# Patient Record
Sex: FEMALE | Race: WHITE | Employment: OTHER | URBAN - METROPOLITAN AREA
[De-identification: names, ages, dates, MRNs, and addresses within clinical notes are randomized per-mention and may not be internally consistent; named-entity substitution may affect disease eponyms.]

---

## 2018-01-19 ENCOUNTER — TELEPHONE (OUTPATIENT)
Dept: FAMILY MEDICINE | Facility: CLINIC | Age: 63
End: 2018-01-19

## 2018-01-19 NOTE — TELEPHONE ENCOUNTER
Patient is calling because she would like to know if Dr. Guajardo will see her as a new patient. Please follow up with patient.

## 2018-04-16 ENCOUNTER — OFFICE VISIT (OUTPATIENT)
Dept: FAMILY MEDICINE | Facility: CLINIC | Age: 63
End: 2018-04-16
Payer: COMMERCIAL

## 2018-04-16 VITALS
OXYGEN SATURATION: 99 % | WEIGHT: 128 LBS | DIASTOLIC BLOOD PRESSURE: 73 MMHG | HEART RATE: 65 BPM | BODY MASS INDEX: 23.55 KG/M2 | HEIGHT: 62 IN | SYSTOLIC BLOOD PRESSURE: 113 MMHG | TEMPERATURE: 98 F | RESPIRATION RATE: 16 BRPM

## 2018-04-16 DIAGNOSIS — Z00.00 ROUTINE GENERAL MEDICAL EXAMINATION AT A HEALTH CARE FACILITY: Primary | ICD-10-CM

## 2018-04-16 DIAGNOSIS — J45.20 MILD INTERMITTENT ASTHMA WITHOUT COMPLICATION: ICD-10-CM

## 2018-04-16 DIAGNOSIS — F41.9 ANXIETY: ICD-10-CM

## 2018-04-16 DIAGNOSIS — K58.1 IRRITABLE BOWEL SYNDROME WITH CONSTIPATION: ICD-10-CM

## 2018-04-16 DIAGNOSIS — E78.5 HYPERLIPIDEMIA, UNSPECIFIED HYPERLIPIDEMIA TYPE: ICD-10-CM

## 2018-04-16 DIAGNOSIS — L40.9 PSORIASIS: ICD-10-CM

## 2018-04-16 DIAGNOSIS — Z11.59 NEED FOR HEPATITIS C SCREENING TEST: ICD-10-CM

## 2018-04-16 DIAGNOSIS — E03.9 ACQUIRED HYPOTHYROIDISM: ICD-10-CM

## 2018-04-16 LAB
ALBUMIN SERPL-MCNC: 4.1 G/DL (ref 3.4–5)
ALP SERPL-CCNC: 80 U/L (ref 40–150)
ALT SERPL W P-5'-P-CCNC: 30 U/L (ref 0–50)
ANION GAP SERPL CALCULATED.3IONS-SCNC: 5 MMOL/L (ref 3–14)
AST SERPL W P-5'-P-CCNC: 31 U/L (ref 0–45)
BILIRUB SERPL-MCNC: 1.4 MG/DL (ref 0.2–1.3)
BUN SERPL-MCNC: 13 MG/DL (ref 7–30)
CALCIUM SERPL-MCNC: 9.2 MG/DL (ref 8.5–10.1)
CHLORIDE SERPL-SCNC: 108 MMOL/L (ref 94–109)
CHOLEST SERPL-MCNC: 284 MG/DL
CO2 SERPL-SCNC: 29 MMOL/L (ref 20–32)
CREAT SERPL-MCNC: 0.91 MG/DL (ref 0.52–1.04)
GFR SERPL CREATININE-BSD FRML MDRD: 63 ML/MIN/1.7M2
GLUCOSE SERPL-MCNC: 94 MG/DL (ref 70–99)
HDLC SERPL-MCNC: 75 MG/DL
LDLC SERPL CALC-MCNC: 177 MG/DL
MAGNESIUM SERPL-MCNC: 2.5 MG/DL (ref 1.6–2.3)
NONHDLC SERPL-MCNC: 209 MG/DL
POTASSIUM SERPL-SCNC: 3.8 MMOL/L (ref 3.4–5.3)
PROT SERPL-MCNC: 7.4 G/DL (ref 6.8–8.8)
SODIUM SERPL-SCNC: 142 MMOL/L (ref 133–144)
TRIGL SERPL-MCNC: 162 MG/DL
TSH SERPL DL<=0.005 MIU/L-ACNC: 2.27 MU/L (ref 0.4–4)

## 2018-04-16 PROCEDURE — 86141 C-REACTIVE PROTEIN HS: CPT | Performed by: INTERNAL MEDICINE

## 2018-04-16 PROCEDURE — 80053 COMPREHEN METABOLIC PANEL: CPT | Performed by: INTERNAL MEDICINE

## 2018-04-16 PROCEDURE — 99386 PREV VISIT NEW AGE 40-64: CPT | Performed by: INTERNAL MEDICINE

## 2018-04-16 PROCEDURE — 84443 ASSAY THYROID STIM HORMONE: CPT | Performed by: INTERNAL MEDICINE

## 2018-04-16 PROCEDURE — 80061 LIPID PANEL: CPT | Performed by: INTERNAL MEDICINE

## 2018-04-16 PROCEDURE — 36415 COLL VENOUS BLD VENIPUNCTURE: CPT | Performed by: INTERNAL MEDICINE

## 2018-04-16 PROCEDURE — 86803 HEPATITIS C AB TEST: CPT | Performed by: INTERNAL MEDICINE

## 2018-04-16 PROCEDURE — 83735 ASSAY OF MAGNESIUM: CPT | Performed by: INTERNAL MEDICINE

## 2018-04-16 RX ORDER — ALBUTEROL SULFATE 90 UG/1
2 AEROSOL, METERED RESPIRATORY (INHALATION) EVERY 6 HOURS PRN
Qty: 1 INHALER | Refills: 0 | COMMUNITY
Start: 2018-04-16 | End: 2019-11-22

## 2018-04-16 RX ORDER — LEVOTHYROXINE SODIUM 50 UG/1
50 TABLET ORAL
COMMUNITY
Start: 2017-08-28 | End: 2018-06-29

## 2018-04-16 RX ORDER — ZOLPIDEM TARTRATE 10 MG/1
10 TABLET ORAL
COMMUNITY
Start: 2017-05-11 | End: 2018-08-21

## 2018-04-16 RX ORDER — CITALOPRAM HYDROBROMIDE 40 MG/1
TABLET ORAL
COMMUNITY
Start: 2018-01-26 | End: 2018-06-29

## 2018-04-16 RX ORDER — FLUOCINOLONE ACETONIDE 0.1 MG/ML
SOLUTION TOPICAL 2 TIMES DAILY
COMMUNITY
Start: 2018-04-16 | End: 2021-09-24

## 2018-04-16 NOTE — PROGRESS NOTES
"  SUBJECTIVE:   Salma Lew is a 63 year old female who presents to clinic today for the following health issues:      New Patient/Transfer of Care    She has psoriasis on scalp and fragrance allergy  Hands are painful  Ears are itchy    Patient has had anxiety and depression for years  She is on Celexa  No counselor at present      She has low thyroid  And high lipids    She has exercise induced asthma      Patient has IBS with constipation  She has had colon exam  2011  She takes high fibre diet  citrucel is not effective  miralax does not help  Magnesium works    Patient exercises daily Yoga also        Problem list and histories reviewed & adjusted, as indicated.  Additional history: as documented    Past Medical History:   Diagnosis Date     Acquired hypothyroidism 4/16/2018     Anxiety      Hand arthritis      Hyperlipidemia      Hyperlipidemia LDL goal <100      Hypothyroid      Mild intermittent asthma      Psoriasis     scalp         Current Outpatient Prescriptions   Medication Sig Dispense Refill     zolpidem (AMBIEN) 10 MG tablet Take 10 mg by mouth       levothyroxine (SYNTHROID/LEVOTHROID) 50 MCG tablet Take 50 mcg by mouth       citalopram (CELEXA) 40 MG tablet TAKE 1 BY MOUTH EVERY other day       AMOXICILLIN PO For Adult Acne-PRN         Reviewed and updated as needed this visit by clinical staff  Allergies  Meds  Problems  Med Hx       Reviewed and updated as needed this visit by Provider  Allergies  Meds  Problems  Med Hx         ROS:  Constitutional, HEENT, cardiovascular, pulmonary, GI, , musculoskeletal, neuro, skin, endocrine and psych systems are negative, except as otherwise noted.    OBJECTIVE:     /73 (BP Location: Left arm, Patient Position: Chair, Cuff Size: Adult Regular)  Pulse 65  Temp 98  F (36.7  C) (Oral)  Resp 16  Ht 5' 1.5\" (1.562 m)  Wt 128 lb (58.1 kg)  SpO2 99%  BMI 23.79 kg/m2  Body mass index is 23.79 kg/(m^2).  GENERAL: healthy, alert and no " distress  EYES: Eyes grossly normal to inspection, PERRL and conjunctivae and sclerae normal  HENT: ear canals and TM's normal, nose and mouth without ulcers or lesions  NECK: no adenopathy, no asymmetry, masses, or scars and thyroid normal to palpation  RESP: lungs clear to auscultation - no rales, rhonchi or wheezes  BREAST: normal without masses, tenderness or nipple discharge and no palpable axillary masses or adenopathy  CV: regular rate and rhythm, normal S1 S2, no S3 or S4, no murmur, click or rub, no peripheral edema and peripheral pulses strong  ABDOMEN: soft, nontender, no hepatosplenomegaly, no masses and bowel sounds normal  MS: no gross musculoskeletal defects noted, no edema  SKIN:healed lesion   no suspicious lesions or rashes  NEURO: Normal strength and tone, mentation intact and speech normal  PSYCH: mentation appears normal, affect normal/bright        ASSESSMENT/PLAN:             Salma was seen today for establish care.    Diagnoses and all orders for this visit:    Routine general medical examination at a health care facility  Patient is new to me  We discussed about comprehensive health issues    Mammogram colon exam uptodate  shingrix today  Exercise and diet ok  Irritable bowel syndrome with constipation  -     Magnesium  Continue fibre    Psoriasis    On synalar  Discussed about lubrication of skin  Acquired hypothyroidism  -     TSH with free T4 reflex    Hyperlipidemia, unspecified hyperlipidemia type  -     Lipid panel reflex to direct LDL Fasting  -     Comprehensive metabolic panel  -     CRP cardiac risk  Continue statins  Anxiety  Continue celexa    Mild intermittent asthma without complication    occ proair    Need for hepatitis C screening test  -     Hepatitis C Screen Reflex to HCV RNA Quant and Genotype            Saqib Guajardo MD  [facility]

## 2018-04-16 NOTE — MR AVS SNAPSHOT
"              After Visit Summary   4/16/2018    Salma Lew    MRN: [id number]           Patient Information     Date Of Birth          1955        Visit Information        Provider Department      4/16/2018 8:30 AM Saqib Guajardo MD [facility]        Today's Diagnoses     Routine general medical examination at a health care facility    -  1    Irritable bowel syndrome with constipation        Psoriasis        Acquired hypothyroidism        Hyperlipidemia, unspecified hyperlipidemia type        Anxiety        Mild intermittent asthma without complication        Need for hepatitis C screening test           Follow-ups after your visit        Who to contact     If you have questions or need follow up information about today's clinic visit or your schedule please contact [facility] directly at [phone].  Normal or non-critical lab and imaging results will be communicated to you by MyChart, letter or phone within 4 business days after the clinic has received the results. If you do not hear from us within 7 days, please contact the clinic through MyChart or phone. If you have a critical or abnormal lab result, we will notify you by phone as soon as possible.  Submit refill requests through [employer] or call your pharmacy and they will forward the refill request to us. Please allow 3 business days for your refill to be completed.          Additional Information About Your Visit        MyChart Information     [employer] lets you send messages to your doctor, view your test results, renew your prescriptions, schedule appointments and more. To sign up, go to [email or web address] . Click on \"Log in\" on the left side of the screen, which will take you to the Welcome page. Then click on \"Sign up Now\" on the right side of the page.     You will be asked to enter the access code listed below, as well as some personal information. Please follow the directions to create your username and password.   " "  Your access code is: [email or web address]  Expires: 7/15/2018  9:17 AM     Your access code will  in 90 days. If you need help or a new code, please call your [place] clinic or [phone].        Care EveryWhere ID     This is your Care EveryWhere ID. This could be used by other organizations to access your [place] medical records  [id number]        Your Vitals Were     Pulse Temperature Respirations Height Pulse Oximetry BMI (Body Mass Index)    65 98  F (36.7  C) (Oral) 16 5' 1.5\" (1.562 m) 99% 23.79 kg/m2       Blood Pressure from Last 3 Encounters:   18 113/73    Weight from Last 3 Encounters:   18 128 lb (58.1 kg)              We Performed the Following     Comprehensive metabolic panel     CRP cardiac risk     Hepatitis C Screen Reflex to HCV RNA Quant and Genotype     Lipid panel reflex to direct LDL Fasting     Magnesium     TSH with free T4 reflex        Primary Care Provider Office Phone # Fax #    Bhavkaley Guajardo [phone] [address]  [facility] [address]        Equal Access to Services     [facility]: Hadii aad ku hadasho Soomaali, waaxda luqadaha, qaybta kaalmada adechavezda, alfonzo rucker . So [facility] [phone].    ATENCIÓN: Si habla español, tiene a calvert disposición servicios gratuitos de asistencia lingüística. [facility] [phone].    We comply with applicable federal civil rights laws and [place] laws. We do not discriminate on the basis of race, color, national origin, age, disability, sex, sexual orientation, or gender identity.            Thank you!     Thank you for choosing [facility]  for your care. Our goal is always to provide you with excellent care. Hearing back from our patients is one way we can continue to improve our services. Please take a few minutes to complete the written survey that you may receive in the mail after your visit with us. Thank you!             Your Updated Medication List - " Protect others around you: Learn how to safely use, store and throw away your medicines at www.disposemymeds.org.          This list is accurate as of 4/16/18  9:17 AM.  Always use your most recent med list.                   Brand Name Dispense Instructions for use Diagnosis    albuterol 108 (90 Base) MCG/ACT Inhaler    PROAIR HFA/PROVENTIL HFA/VENTOLIN HFA    1 Inhaler    Inhale 2 puffs into the lungs every 6 hours as needed for shortness of breath / dyspnea or wheezing    Mild intermittent asthma without complication       AMOXICILLIN PO      For Adult Acne-PRN        citalopram 40 MG tablet    celeXA     TAKE 1 BY MOUTH EVERY other day        fluocinolone 0.01 % solution    SYNALAR     Apply topically 2 times daily    Psoriasis       levothyroxine 50 MCG tablet    SYNTHROID/LEVOTHROID     Take 50 mcg by mouth        zolpidem 10 MG tablet    AMBIEN     Take 10 mg by mouth

## 2018-04-17 ENCOUNTER — MYC MEDICAL ADVICE (OUTPATIENT)
Dept: FAMILY MEDICINE | Facility: CLINIC | Age: 63
End: 2018-04-17

## 2018-04-17 ENCOUNTER — TELEPHONE (OUTPATIENT)
Dept: FAMILY MEDICINE | Facility: CLINIC | Age: 63
End: 2018-04-17

## 2018-04-17 DIAGNOSIS — E78.5 HYPERLIPIDEMIA LDL GOAL <100: Primary | ICD-10-CM

## 2018-04-17 LAB
CRP SERPL HS-MCNC: 3.3 MG/L
HCV AB SERPL QL IA: NONREACTIVE

## 2018-04-17 RX ORDER — ROSUVASTATIN CALCIUM 10 MG/1
10 TABLET, COATED ORAL DAILY
Qty: 30 TABLET | Refills: 3 | Status: SHIPPED | OUTPATIENT
Start: 2018-04-17 | End: 2018-11-06

## 2018-04-17 RX ORDER — ATORVASTATIN CALCIUM 10 MG/1
10 TABLET, FILM COATED ORAL DAILY
Qty: 90 TABLET | Refills: 1 | Status: SHIPPED | OUTPATIENT
Start: 2018-04-17 | End: 2018-11-06

## 2018-04-17 ASSESSMENT — ASTHMA QUESTIONNAIRES: ACT_TOTALSCORE: 25

## 2018-04-17 NOTE — TELEPHONE ENCOUNTER
Dr Guajardo,  [facility] and advise.  Thanks,  Lucila VELAZQUEZ RN

## 2018-04-18 NOTE — TELEPHONE ENCOUNTER
PCP see below response from patient   Also routing to CS Prior Auth as pt reporting difficulty filling atorvastatin due to insurance    Bianca EPSTEIN RN

## 2018-04-21 ENCOUNTER — HEALTH MAINTENANCE LETTER (OUTPATIENT)
Age: 63
End: 2018-04-21

## 2018-05-11 ENCOUNTER — OFFICE VISIT (OUTPATIENT)
Dept: CARDIOLOGY | Facility: CLINIC | Age: 63
End: 2018-05-11
Attending: INTERNAL MEDICINE
Payer: COMMERCIAL

## 2018-05-11 VITALS
HEART RATE: 60 BPM | DIASTOLIC BLOOD PRESSURE: 84 MMHG | SYSTOLIC BLOOD PRESSURE: 120 MMHG | BODY MASS INDEX: 23.55 KG/M2 | HEIGHT: 62 IN | WEIGHT: 128 LBS

## 2018-05-11 DIAGNOSIS — E78.5 HYPERLIPIDEMIA LDL GOAL <100: Primary | ICD-10-CM

## 2018-05-11 PROCEDURE — 99203 OFFICE O/P NEW LOW 30 MIN: CPT | Performed by: INTERNAL MEDICINE

## 2018-05-11 PROCEDURE — 93000 ELECTROCARDIOGRAM COMPLETE: CPT | Performed by: INTERNAL MEDICINE

## 2018-05-11 NOTE — PROGRESS NOTES
Service Date: 2018      [place] [address]      RE: Salma Lew   MRN: [id number]   : 1955      Dear Dr. Guajardo:       I had the pleasure of seeing your patient Salma Lew at [facility] for evaluation of hyperlipidemia.      Salma Lew is a delightful, 63-year-old female with a history of increasing LDL cholesterol and a recent C-reactive protein of 3.3.  The patient's LDL cholesterol has ranged in  from 160 to  of 99, ranging even higher to 184 in .  More recently on 2018, LDL was 177.  Triglycerides have also varied greatly and recently were 162.  HDL has been high and was 75.  Total cholesterol is 284.  The patient does not eat meat.  She exercises 7 days per week, generally aerobics, such as walking and yoga for  minutes.  She does some biking in the summer.  She has never had a myocardial infarction or stroke.  She denies angina pectoris.  She does not have a history of hypertension or diabetes.  She does have a history of hypothyroidism and is currently on levothyroxine therapy.  Her most recent TSH was normal at 2.27 on 2018.  The patient is a nonsmoker since college.      FAMILY HISTORY:  Father with bypass surgery in his early 60s and congestive heart failure.  No other premature atherosclerosis.      ALLERGIES:  Neosporin.      MEDICATIONS:  As below.  Of note, a prescription was placed for one statin, which was rejected by the insurance company.  Another statin was then placed and accepted, but the patient has not filled this.  I assume the first prescription was with simvastatin and the second atorvastatin.      PAST MEDICAL HISTORY:   1.  Previous childbirth.     2.  Lipoma excision.   3.  Mild intermittent asthma.   4.  Hypothyroidism, on replacement.   5.  Hand arthritis.   6.  Mild intermittent asthma.   7.  Psoriasis.      No history of peptic ulcer disease, cancer,  tuberculosis, kidney stones or disease.      SOCIAL HISTORY:  The patient is  and has 2 grown sons.  Alcohol is 7 glasses of wine per week.  Exercise is as above.      FAMILY HISTORY:  As above.      REVIEW OF SYSTEMS:  A 12 point review of system is performed with pertinent positives and negatives as listed in the HPI.  All other reviews of systems are asked and are negative.      PHYSICAL EXAMINATION:   VITAL SIGNS:  Current blood pressure 120/84, pulse 60 and regular, weight 128 pounds, BMI 24.   GENERAL:  The patient is an alert white female in no acute distress.   HEENT:  Benign without xanthelasma.   NECK:  Supple without thyromegaly.  Carotid upstroke is brisk without bruits.   CHEST:  Clear to auscultation without wheezes, rales or rhonchi.  No kyphosis or scoliosis.   CARDIAC:  Regular rate and rhythm without gallop or murmur.  No heave, rub or thrill.  No JVD or HJR.  Pulses are all intact without bruits.   ABDOMEN:  Soft and nontender without organomegaly.  Bowel sounds are present.  No bruits.   EXTREMITIES:  Without cyanosis, clubbing or edema.      IMAGING:  EKG from today personally reviewed and discussed with the patient shows normal sinus rhythm without ST or T-wave abnormalities.  This is a normal EKG.      I reviewed the patient's old chart and found a stress echocardiogram in 12/2004, which was normal.  An echocardiogram at that same time was normal without valvular abnormalities and normal left ventricular function.      ASSESSMENT:     1.  Salma Lew is a delightful, 63-year-old female with varying LDL cholesterol and mildly elevated triglycerides.  To date, she does not have evidence of known coronary artery disease.  She has no known risk factors.  Her father's coronary artery disease was not premature, and he had significant risk factors for developing that.  The patient's total cholesterol to HDL ratio is less than 4.5.  I suspect that her current levels are more genetic than  lifestyle.  The American Heart Association and American College of Cardiology discussion in 2014 suggested that there are 4 categories that would deserve statin therapy:  Known coronary artery disease, diabetes mellitus, LDL cholesterol greater than 190 and a calculated 10 year risk profile for myocardial infarction or stroke of greater than 7.5%.  In primary prevention, 10% may be more applicable.  We calculated this patient's risk profile at 3.4% with optimal ASCVD risk of 2.3%.  The only risk that this patient has is her C-reactive protein of 3.3.  Perhaps this is due to some hand arthritis.  In order to better understand this patient's risk, we have discussed performing a CT calcium score.  This will be performed in the next week.  If this is normal, we will not begin statin therapy.  If this is abnormal, such as an Agatston score of greater than 200, we will consider beginning statin therapy.  The patient understands this test as low-dose radiation, but no dye.  She is willing to proceed.   2.  I have asked the patient to continue with her excellent exercise routine and include as much aerobic exercise per week as she thinks possible.  This will help lower her triglycerides a bit.      It is my pleasure to assist in the care of Salma Nieves.  I will await the results of the CT calcium score for further assessment and treatment options.  I will make sure that this is conveyed to Dr. Guajardo and the patient as soon as available.  All the patient's questions were answered to her satisfaction.      Sincerely,      Eileen Barnes MD, FACC         EILEEN BARNES MD, FACC             D: 2018   T: 2018   MT: sabas      Name:     SALMA NIEVES   MRN:      [id number]        Account:      [id number]   :      1955           Service Date: 2018      Document: [id number]

## 2018-05-11 NOTE — MR AVS SNAPSHOT
After Visit Summary   5/11/2018    Salma Lew    MRN: [id number]           Patient Information     Date Of Birth          1955        Visit Information        Provider Department      5/11/2018 8:15 AM Gerber Willett MD [facility]        Today's Diagnoses     Hyperlipidemia LDL goal <100    -  1       Follow-ups after your visit        Future tests that were ordered for you today     Open Future Orders        Priority Expected Expires Ordered    CT Coronary Calcium Scan Routine 5/12/2018 5/11/2019 5/11/2018            Who to contact     If you have questions or need follow up information about today's clinic visit or your schedule please contact [facility] directly at [phone].  Normal or non-critical lab and imaging results will be communicated to you by [employer], letter or phone within 4 business days after the clinic has received the results. If you do not hear from us within 7 days, please contact the clinic through [employer] or phone. If you have a critical or abnormal lab result, we will notify you by phone as soon as possible.  Submit refill requests through [employer] or call your pharmacy and they will forward the refill request to us. Please allow 3 business days for your refill to be completed.          Additional Information About Your Visit        MyChart Information     [employer] gives you secure access to your electronic health record. If you see a primary care provider, you can also send messages to your care team and make appointments. If you have questions, please call your primary care clinic.  If you do not have a primary care provider, please call [phone] and they will assist you.        Care EveryWhere ID     This is your Care EveryWhere ID. This could be used by other organizations to access your [place] medical records  [id number]        Your Vitals Were     Pulse Height BMI (Body Mass  "Index)             60 1.562 m (5' 1.5\") 23.79 kg/m2          Blood Pressure from Last 3 Encounters:   05/11/18 120/84   04/16/18 113/73    Weight from Last 3 Encounters:   05/11/18 58.1 kg (128 lb)   04/16/18 58.1 kg (128 lb)              We Performed the Following     EKG 12-lead complete w/read - Clinics (performed today)        Primary Care Provider Office Phone # Fax #    Saqib MD Casandra [phone] [address] [facility] [address]  [facility] [address]        Equal Access to Services     [facility]: Hadii felix Varghese, warj kuo, jordon crawford, alfonzo rucker . So [facility] [phone].    ATENCIÓN: Si habla español, tiene a calvert disposición servicios gratuitos de asistencia lingüística. [facility] [phone].    We comply with applicable federal civil rights laws and [place] laws. We do not discriminate on the basis of race, color, national origin, age, disability, sex, sexual orientation, or gender identity.            Thank you!     Thank you for choosing [facility]  for your care. Our goal is always to provide you with excellent care. Hearing back from our patients is one way we can continue to improve our services. Please take a few minutes to complete the written survey that you may receive in the mail after your visit with us. Thank you!             Your Updated Medication List - Protect others around you: Learn how to safely use, store and throw away your medicines at www.disposemymeds.org.          This list is accurate as of 5/11/18  8:55 AM.  Always use your most recent med list.                   Brand Name Dispense Instructions for use Diagnosis    albuterol 108 (90 Base) MCG/ACT Inhaler    PROAIR HFA/PROVENTIL HFA/VENTOLIN HFA    1 Inhaler    Inhale 2 puffs into the lungs every 6 hours as needed for shortness of breath / dyspnea or wheezing    Mild intermittent asthma without complication       " AMOXICILLIN PO      For Adult Acne-PRN        atorvastatin 10 MG tablet    LIPITOR    90 tablet    Take 1 tablet (10 mg) by mouth daily    Hyperlipidemia LDL goal <100       citalopram 40 MG tablet    celeXA     TAKE 1 BY MOUTH EVERY other day        fluocinolone 0.01 % solution    SYNALAR     Apply topically 2 times daily    Psoriasis       levothyroxine 50 MCG tablet    SYNTHROID/LEVOTHROID     Take 50 mcg by mouth        rosuvastatin 10 MG tablet    CRESTOR    30 tablet    Take 1 tablet (10 mg) by mouth daily    Hyperlipidemia, unspecified hyperlipidemia type       zolpidem 10 MG tablet    AMBIEN     Take 10 mg by mouth

## 2018-05-11 NOTE — LETTER
2018      Saqib Guajardo MD  [address]  [facility] [address]      RE: Salma Lew       Dear Colleague,    I had the pleasure of seeing Salma Lew in the [facility].    Service Date: 2018      Saqib Guajardo MD   [facility]    [address] [place] [address]      RE: Salma Lew   MRN: [id number]   : 1955      Dear Dr. Guajardo:       I had the pleasure of seeing your patient Salma Lew at [facility] for evaluation of hyperlipidemia.      Salma Lew is a delightful, 63-year-old female with a history of increasing LDL cholesterol and a recent C-reactive protein of 3.3.  The patient's LDL cholesterol has ranged in  from 160 to 2005 of 99, ranging even higher to 184 in .  More recently on 2018, LDL was 177.  Triglycerides have also varied greatly and recently were 162.  HDL has been high and was 75.  Total cholesterol is 284.  The patient does not eat meat.  She exercises 7 days per week, generally aerobics, such as walking and yoga for  minutes.  She does some biking in the summer.  She has never had a myocardial infarction or stroke.  She denies angina pectoris.  She does not have a history of hypertension or diabetes.  She does have a history of hypothyroidism and is currently on levothyroxine therapy.  Her most recent TSH was normal at 2.27 on 2018.  The patient is a nonsmoker since college.      FAMILY HISTORY:  Father with bypass surgery in his early 60s and congestive heart failure.  No other premature atherosclerosis.      ALLERGIES:  Neosporin.      MEDICATIONS:  As below.  Of note, a prescription was placed for one statin, which was rejected by the insurance company.  Another statin was then placed and accepted, but the patient has not filled this.  I assume the first prescription was with simvastatin and the second atorvastatin.      PAST MEDICAL HISTORY:   1.  Previous childbirth.      2.  Lipoma excision.   3.  Mild intermittent asthma.   4.  Hypothyroidism, on replacement.   5.  Hand arthritis.   6.  Mild intermittent asthma.   7.  Psoriasis.      No history of peptic ulcer disease, cancer, tuberculosis, kidney stones or disease.      SOCIAL HISTORY:  The patient is  and has 2 grown sons.  Alcohol is 7 glasses of wine per week.  Exercise is as above.      FAMILY HISTORY:  As above.      REVIEW OF SYSTEMS:  A 12 point review of system is performed with pertinent positives and negatives as listed in the HPI.  All other reviews of systems are asked and are negative.      PHYSICAL EXAMINATION:   VITAL SIGNS:  Current blood pressure 120/84, pulse 60 and regular, weight 128 pounds, BMI 24.   GENERAL:  The patient is an alert white female in no acute distress.   HEENT:  Benign without xanthelasma.   NECK:  Supple without thyromegaly.  Carotid upstroke is brisk without bruits.   CHEST:  Clear to auscultation without wheezes, rales or rhonchi.  No kyphosis or scoliosis.   CARDIAC:  Regular rate and rhythm without gallop or murmur.  No heave, rub or thrill.  No JVD or HJR.  Pulses are all intact without bruits.   ABDOMEN:  Soft and nontender without organomegaly.  Bowel sounds are present.  No bruits.   EXTREMITIES:  Without cyanosis, clubbing or edema.      IMAGING:  EKG from today personally reviewed and discussed with the patient shows normal sinus rhythm without ST or T-wave abnormalities.  This is a normal EKG.      I reviewed the patient's old chart and found a stress echocardiogram in 12/2004, which was normal.  An echocardiogram at that same time was normal without valvular abnormalities and normal left ventricular function.      ASSESSMENT:     1.  Samla Lew is a delightful, 63-year-old female with varying LDL cholesterol and mildly elevated triglycerides.  To date, she does not have evidence of known coronary artery disease.  She has no known risk factors.  Her father's coronary artery  disease was not premature, and he had significant risk factors for developing that.  The patient's total cholesterol to HDL ratio is less than 4.5.  I suspect that her current levels are more genetic than lifestyle.  The American Heart Association and American College of Cardiology discussion in 2014 suggested that there are 4 categories that would deserve statin therapy:  Known coronary artery disease, diabetes mellitus, LDL cholesterol greater than 190 and a calculated 10 year risk profile for myocardial infarction or stroke of greater than 7.5%.  In primary prevention, 10% may be more applicable.  We calculated this patient's risk profile at 3.4% with optimal ASCVD risk of 2.3%.  The only risk that this patient has is her C-reactive protein of 3.3.  Perhaps this is due to some hand arthritis.  In order to better understand this patient's risk, we have discussed performing a CT calcium score.  This will be performed in the next week.  If this is normal, we will not begin statin therapy.  If this is abnormal, such as an Agatston score of greater than 200, we will consider beginning statin therapy.  The patient understands this test as low-dose radiation, but no dye.  She is willing to proceed.   2.  I have asked the patient to continue with her excellent exercise routine and include as much aerobic exercise per week as she thinks possible.  This will help lower her triglycerides a bit.      It is my pleasure to assist in the care of Salma Nieves.  I will await the results of the CT calcium score for further assessment and treatment options.  I will make sure that this is conveyed to Dr. Guajardo and the patient as soon as available.  All the patient's questions were answered to her satisfaction.      Sincerely,      Eileen Barnes MD, FACC         EILEEN BARNES MD, FACC             D: 2018   T: 2018   MT: sabas      Name:     SALMA NIEVES   MRN:      [id number]        Account:      [id number]   :       1955           Service Date: 05/11/2018      Document: [id number]         Outpatient Encounter Prescriptions as of 5/11/2018   Medication Sig Dispense Refill     AMOXICILLIN PO For Adult Acne-PRN       citalopram (CELEXA) 40 MG tablet TAKE 1 BY MOUTH EVERY other day       levothyroxine (SYNTHROID/LEVOTHROID) 50 MCG tablet Take 50 mcg by mouth       zolpidem (AMBIEN) 10 MG tablet Take 10 mg by mouth       albuterol (PROAIR HFA/PROVENTIL HFA/VENTOLIN HFA) 108 (90 Base) MCG/ACT Inhaler Inhale 2 puffs into the lungs every 6 hours as needed for shortness of breath / dyspnea or wheezing 1 Inhaler 0     atorvastatin (LIPITOR) 10 MG tablet Take 1 tablet (10 mg) by mouth daily (Patient not taking: Reported on 5/11/2018) 90 tablet 1     fluocinolone (SYNALAR) 0.01 % solution Apply topically 2 times daily       rosuvastatin (CRESTOR) 10 MG tablet Take 1 tablet (10 mg) by mouth daily (Patient not taking: Reported on 5/11/2018) 30 tablet 3     No facility-administered encounter medications on file as of 5/11/2018.        Again, thank you for allowing me to participate in the care of your patient.      Sincerely,    Gerber Willett MD     [facility]

## 2018-05-11 NOTE — LETTER
5/11/2018    Saqib Guajardo MD  [address]  [place] [address]    RE: Salma Lew       Dear Colleague,    I had the pleasure of seeing Salma Lew in the [facility].    HPI and Plan:   See dictation:[id number]    Orders Placed This Encounter   Procedures     CT Coronary Calcium Scan     EKG 12-lead complete w/read - Clinics (performed today)       No orders of the defined types were placed in this encounter.      There are no discontinued medications.      Encounter Diagnosis   Name Primary?     Hyperlipidemia LDL goal <100 Yes       CURRENT MEDICATIONS:  Current Outpatient Prescriptions   Medication Sig Dispense Refill     AMOXICILLIN PO For Adult Acne-PRN       citalopram (CELEXA) 40 MG tablet TAKE 1 BY MOUTH EVERY other day       levothyroxine (SYNTHROID/LEVOTHROID) 50 MCG tablet Take 50 mcg by mouth       zolpidem (AMBIEN) 10 MG tablet Take 10 mg by mouth       albuterol (PROAIR HFA/PROVENTIL HFA/VENTOLIN HFA) 108 (90 Base) MCG/ACT Inhaler Inhale 2 puffs into the lungs every 6 hours as needed for shortness of breath / dyspnea or wheezing 1 Inhaler 0     atorvastatin (LIPITOR) 10 MG tablet Take 1 tablet (10 mg) by mouth daily (Patient not taking: Reported on 5/11/2018) 90 tablet 1     fluocinolone (SYNALAR) 0.01 % solution Apply topically 2 times daily       rosuvastatin (CRESTOR) 10 MG tablet Take 1 tablet (10 mg) by mouth daily (Patient not taking: Reported on 5/11/2018) 30 tablet 3       ALLERGIES     Allergies   Allergen Reactions     Neosporin Rash     Positive allergy skin test       PAST MEDICAL HISTORY:  Past Medical History:   Diagnosis Date     Acquired hypothyroidism 4/16/2018     Anxiety      Hand arthritis      Hyperlipidemia      Hyperlipidemia LDL goal <100      Hypothyroid      Mild intermittent asthma      Psoriasis     scalp       PAST SURGICAL HISTORY:  Past Surgical History:   Procedure Laterality Date     COLONOSCOPY       SURGICAL PATHOLOGY EXAM    "      FAMILY HISTORY:  Family History   Problem Relation Age of Onset     Osteoarthritis Mother      Ovarian Cancer Mother 85     Type 2 Diabetes Father 88     Heart Failure Father      Coronary Artery Disease Father 60     CABG x 4     Obesity Father      Depression Son        SOCIAL HISTORY:  Social History     Social History     Marital status:      Spouse name: N/A     Number of children: N/A     Years of education: N/A     Social History Main Topics     Smoking status: Former Smoker     Smokeless tobacco: Never Used      Comment: teenager for a few years     Alcohol use Yes      Comment: 7/ week- wine     Drug use: No     Sexual activity: Yes     Partners: Male      Comment:      Other Topics Concern     Parent/Sibling W/ Cabg, Mi Or Angioplasty Before 65f 55m? No     Social History Narrative       Review of Systems:  Skin:  Negative       Eyes:  Negative      ENT:  Negative      Respiratory:  Negative       Cardiovascular:  Negative      Gastroenterology: Negative      Genitourinary:  not assessed      Musculoskeletal:  Negative      Neurologic:  Negative      Psychiatric:  Negative      Heme/Lymph/Imm:  Negative      Endocrine:  Negative        Physical Exam:  Vitals: /84  Pulse 60  Ht 1.562 m (5' 1.5\")  Wt 58.1 kg (128 lb)  BMI 23.79 kg/m2    Constitutional:  cooperative, alert and oriented, well developed, well nourished, in no acute distress        Skin:  warm and dry to the touch, no apparent skin lesions or masses noted          Head:  normocephalic, no masses or lesions        Eyes:  pupils equal and round;conjunctivae and lids unremarkable;sclera white;no xanthalasma        Lymph:      ENT:  no pallor or cyanosis, dentition good        Neck:  carotid pulses are full and equal bilaterally, JVP normal, no carotid bruit        Respiratory:  normal breath sounds, clear to auscultation, normal A-P diameter, normal symmetry, normal respiratory excursion, no use of accessory muscles    "      Cardiac: regular rhythm, normal S1/S2, no S3 or S4, apical impulse not displaced, no murmurs, gallops or rubs                pulses full and equal, no bruits auscultated                                        GI:  abdomen soft, non-tender, BS normoactive, no mass, no HSM, no bruits        Extremities and Muscular Skeletal:  no deformities, clubbing, cyanosis, erythema observed              Neurological:  no gross motor deficits;affect appropriate        Psych:  Alert and Oriented x 3        CC  Saqib Guajardo MD  [address]  [place] [address]                Thank you for allowing me to participate in the care of your patient.      Sincerely,     Gerber Willett MD     [facility]    cc:   Saqib Guajardo MD  [address]  [place] [address]

## 2018-05-11 NOTE — PROGRESS NOTES
HPI and Plan:   See dictation:[id number]    Orders Placed This Encounter   Procedures     CT Coronary Calcium Scan     EKG 12-lead complete w/read - Clinics (performed today)       No orders of the defined types were placed in this encounter.      There are no discontinued medications.      Encounter Diagnosis   Name Primary?     Hyperlipidemia LDL goal <100 Yes       CURRENT MEDICATIONS:  Current Outpatient Prescriptions   Medication Sig Dispense Refill     AMOXICILLIN PO For Adult Acne-PRN       citalopram (CELEXA) 40 MG tablet TAKE 1 BY MOUTH EVERY other day       levothyroxine (SYNTHROID/LEVOTHROID) 50 MCG tablet Take 50 mcg by mouth       zolpidem (AMBIEN) 10 MG tablet Take 10 mg by mouth       albuterol (PROAIR HFA/PROVENTIL HFA/VENTOLIN HFA) 108 (90 Base) MCG/ACT Inhaler Inhale 2 puffs into the lungs every 6 hours as needed for shortness of breath / dyspnea or wheezing 1 Inhaler 0     atorvastatin (LIPITOR) 10 MG tablet Take 1 tablet (10 mg) by mouth daily (Patient not taking: Reported on 5/11/2018) 90 tablet 1     fluocinolone (SYNALAR) 0.01 % solution Apply topically 2 times daily       rosuvastatin (CRESTOR) 10 MG tablet Take 1 tablet (10 mg) by mouth daily (Patient not taking: Reported on 5/11/2018) 30 tablet 3       ALLERGIES     Allergies   Allergen Reactions     Neosporin Rash     Positive allergy skin test       PAST MEDICAL HISTORY:  Past Medical History:   Diagnosis Date     Acquired hypothyroidism 4/16/2018     Anxiety      Hand arthritis      Hyperlipidemia      Hyperlipidemia LDL goal <100      Hypothyroid      Mild intermittent asthma      Psoriasis     scalp       PAST SURGICAL HISTORY:  Past Surgical History:   Procedure Laterality Date     COLONOSCOPY       SURGICAL PATHOLOGY EXAM         FAMILY HISTORY:  Family History   Problem Relation Age of Onset     Osteoarthritis Mother      Ovarian Cancer Mother 85     Type 2 Diabetes Father 88     Heart Failure Father      Coronary Artery Disease Father  "60     CABG x 4     Obesity Father      Depression Son        SOCIAL HISTORY:  Social History     Social History     Marital status:      Spouse name: N/A     Number of children: N/A     Years of education: N/A     Social History Main Topics     Smoking status: Former Smoker     Smokeless tobacco: Never Used      Comment: teenager for a few years     Alcohol use Yes      Comment: 7/ week- wine     Drug use: No     Sexual activity: Yes     Partners: Male      Comment:      Other Topics Concern     Parent/Sibling W/ Cabg, Mi Or Angioplasty Before 65f 55m? No     Social History Narrative       Review of Systems:  Skin:  Negative       Eyes:  Negative      ENT:  Negative      Respiratory:  Negative       Cardiovascular:  Negative      Gastroenterology: Negative      Genitourinary:  not assessed      Musculoskeletal:  Negative      Neurologic:  Negative      Psychiatric:  Negative      Heme/Lymph/Imm:  Negative      Endocrine:  Negative        Physical Exam:  Vitals: /84  Pulse 60  Ht 1.562 m (5' 1.5\")  Wt 58.1 kg (128 lb)  BMI 23.79 kg/m2    Constitutional:  cooperative, alert and oriented, well developed, well nourished, in no acute distress        Skin:  warm and dry to the touch, no apparent skin lesions or masses noted          Head:  normocephalic, no masses or lesions        Eyes:  pupils equal and round;conjunctivae and lids unremarkable;sclera white;no xanthalasma        Lymph:      ENT:  no pallor or cyanosis, dentition good        Neck:  carotid pulses are full and equal bilaterally, JVP normal, no carotid bruit        Respiratory:  normal breath sounds, clear to auscultation, normal A-P diameter, normal symmetry, normal respiratory excursion, no use of accessory muscles         Cardiac: regular rhythm, normal S1/S2, no S3 or S4, apical impulse not displaced, no murmurs, gallops or rubs                pulses full and equal, no bruits auscultated                                    "     GI:  abdomen soft, non-tender, BS normoactive, no mass, no HSM, no bruits        Extremities and Muscular Skeletal:  no deformities, clubbing, cyanosis, erythema observed              Neurological:  no gross motor deficits;affect appropriate        Psych:  Alert and Oriented x 3        CC  Saqib Guajardo MD  [address]  [place] [address]

## 2018-05-14 ENCOUNTER — HOSPITAL ENCOUNTER (OUTPATIENT)
Dept: CARDIOLOGY | Facility: CLINIC | Age: 63
Discharge: HOME | End: 2018-05-14
Attending: INTERNAL MEDICINE | Admitting: INTERNAL MEDICINE
Payer: COMMERCIAL

## 2018-05-14 DIAGNOSIS — E78.5 HYPERLIPIDEMIA LDL GOAL <100: ICD-10-CM

## 2018-05-14 PROCEDURE — 75571 CT HRT W/O DYE W/CA TEST: CPT

## 2018-05-14 PROCEDURE — 75571 CT HRT W/O DYE W/CA TEST: CPT | Mod: 26 | Performed by: INTERNAL MEDICINE

## 2018-07-09 ENCOUNTER — MYC MEDICAL ADVICE (OUTPATIENT)
Dept: FAMILY MEDICINE | Facility: CLINIC | Age: 63
End: 2018-07-09

## 2018-07-09 NOTE — TELEPHONE ENCOUNTER
Patient does not need a referral to be seen by Derm. Patient notified by [facility].    Zoie Guerrero  Referral Coordinator

## 2018-08-21 ENCOUNTER — MYC MEDICAL ADVICE (OUTPATIENT)
Dept: FAMILY MEDICINE | Facility: CLINIC | Age: 63
End: 2018-08-21

## 2018-08-21 DIAGNOSIS — G47.00 INSOMNIA, UNSPECIFIED TYPE: Primary | ICD-10-CM

## 2018-08-21 RX ORDER — ZOLPIDEM TARTRATE 10 MG/1
10 TABLET ORAL
Qty: 30 TABLET | Refills: 1 | Status: SHIPPED | OUTPATIENT
Start: 2018-08-21 | End: 2018-08-23

## 2018-08-23 ENCOUNTER — MYC MEDICAL ADVICE (OUTPATIENT)
Dept: FAMILY MEDICINE | Facility: CLINIC | Age: 63
End: 2018-08-23

## 2018-08-23 DIAGNOSIS — G47.00 INSOMNIA, UNSPECIFIED TYPE: Primary | ICD-10-CM

## 2018-08-23 NOTE — TELEPHONE ENCOUNTER
"Pt requesting extended release ambien.  Reports taking \"1/3\" of 10 mg tablet currently but wakes up in middle of night.    Hannah Lubin RN    "

## 2018-08-24 RX ORDER — ZOLPIDEM TARTRATE 10 MG/1
10 TABLET ORAL
Qty: 30 TABLET | Refills: 1 | Status: SHIPPED | OUTPATIENT
Start: 2018-08-24 | End: 2018-12-28

## 2018-08-24 RX ORDER — ZOLPIDEM TARTRATE 6.25 MG/1
12.5 TABLET, FILM COATED, EXTENDED RELEASE ORAL
Qty: 60 TABLET | Refills: 0 | Status: SHIPPED | OUTPATIENT
Start: 2018-08-24 | End: 2018-11-06

## 2018-08-24 NOTE — TELEPHONE ENCOUNTER
To PCP:     Is there an extended-release version of Zolipdem?     Please advise,     Thank you,   Carmina HOOKER RN

## 2018-11-06 ENCOUNTER — OFFICE VISIT (OUTPATIENT)
Dept: FAMILY MEDICINE | Facility: CLINIC | Age: 63
End: 2018-11-06
Payer: COMMERCIAL

## 2018-11-06 VITALS — SYSTOLIC BLOOD PRESSURE: 120 MMHG | HEART RATE: 78 BPM | DIASTOLIC BLOOD PRESSURE: 78 MMHG

## 2018-11-06 DIAGNOSIS — L28.1 PRURIGO NODULARIS: Primary | ICD-10-CM

## 2018-11-06 PROCEDURE — 99214 OFFICE O/P EST MOD 30 MIN: CPT | Performed by: FAMILY MEDICINE

## 2018-11-06 RX ORDER — CLINDAMYCIN PHOSPHATE 10 UG/ML
LOTION TOPICAL
Refills: 3 | COMMUNITY
Start: 2018-07-20 | End: 2021-09-24

## 2018-11-06 RX ORDER — FLUOCINONIDE TOPICAL SOLUTION USP, 0.05% 0.5 MG/ML
SOLUTION TOPICAL
Qty: 60 ML | Refills: 0 | Status: SHIPPED | OUTPATIENT
Start: 2018-11-06 | End: 2021-09-24

## 2018-11-06 RX ORDER — TRIAMCINOLONE ACETONIDE 1 MG/G
CREAM TOPICAL 2 TIMES DAILY
Qty: 30 G | Refills: 1 | Status: SHIPPED | OUTPATIENT
Start: 2018-11-06 | End: 2019-05-28

## 2018-11-06 NOTE — PROGRESS NOTES
[facility] - PRIMARY CARE SKIN    CC : Lesion(s)  SUBJECTIVE:                                                    Salma Lew is a 63 year old female who presents to clinic today because of spots on the back and scalp.    Bothersome lesions noticed by the patient or other skin concerns :  Issue One : Lesion on left posterior shoulder. She has scratched at this spot.  Onset : 9 months ago.  Enlarging : NO.  Bleeding : NO  Itchy or irritating : YES - they are irritated by friction from clothing. Sweating also provokes irritation. She exercises every day.  Pain or tenderness : NO.  Changing color : NO.  Issue Two : Lesion on right scalp.  Onset : 5 years.  Enlarging : NO.  Bleeding : NO  Itchy or irritating : YES.  Pain or tenderness : YES.  Changing color : NO.    Issue Three : She requests a refill of amoxicillin for adult acne.    Personal history of skin cancer : NO.  Family history of skin cancer : NO.    Personal Medical History  Eczema Psoriasis Autoimmune   ?YES ?YES - sensitivities in ears and on scalp NO     Family Medical History  Eczema Psoriasis Autoimmune   NO YES - in father Rheumatoid arthritis in mother     Sun Exposure History  Previous history of significant sun exposure:  Blistering sunburns : YES  Tanning beds : YES - when younger.  Sunscreen Use : YES, SPF : 20-30.  Sun-protective clothing use : No  Wide-brimmed hats : Yes  Sunglasses : Yes    Occupation : photography (both indoor & outdoor).    Refer to electronic medical record (EMR) for past medical history and medications.    INTEGUMENTARY/SKIN: POSITIVE for non-healing lesion  ROS : 14 point review of systems was negative except the symptoms listed above in the HPI.    This document serves as a record of the services and decisions personally performed and made by Princess Haq MD. It was created on her behalf by Simba Craft, a trained medical scribe.  The creation of this document is based on the scribe's personal observations and the  provider's statements to the medical scribe.  Simba Craft, November 6, 2018 9:44 AM      OBJECTIVE:                                                    GENERAL: healthy, alert and no distress  SKIN: Ellis Skin Type - III.  Scalp, Face, Neck and Trunk were examined. The dermatoscope was used to help evaluate pigmented lesions.  Skin Pertinent Findings:  Left upper back : Two 3 mm in size smooth raised hypopigmented lesions. No suspicious characteristics.    Back : left upper back, multiple areas of excoriation    Scalp : 6 mm smooth area of post-inflammatory erythema with overlying excoriation(s) on the right occipital scalp.    Diagnostic Test Results:  none     MDM : discussed the impact of the scratching at the site for causing skin changes.      ASSESSMENT:                                                      Encounter Diagnosis   Name Primary?     Prurigo nodularis Yes         PLAN:                                                    Patient Instructions   FUTURE APPOINTMENTS  Follow up as needed if itchiness persists.    Avoid scratching.    TOPICAL STEROID INSTRUCTIONS  Triamcinolone 0.1% cream.  1. Wash hands before applying topical steroid. Use the adult fingertip unit (FTU) as a guide.    2. Apply sparingly (just enough to rub in) onto affected areas of the left shoulder/back (not to exceed 0.25 FTU per site), two times per day for 2 weeks.  3. Wash off any excess, unused topical steroid.    This higher strength steroid should never be used on face nor groin.    After the initial treatment, topical steroid may be used as needed for flare-ups but only for short-term treatment.    If you are using this for prolonged periods of time to control flare-ups, return to clinic for re-evaluation of treatment.    Keep in mind to also regularly use moisturizer, as this preventative measure can help maintain your skin's natural protective moisture barrier.    TOPICAL STEROID INSTRUCTIONS  Fluocinonide 0.05%  "solution.    Apply a few drops and rub into the affected areas on the scalp, at bedtime for 7 nights.    Never to be used on face or groin.    After the initial treatment, topical steroid may be used as needed for flare-ups but only for short-term treatment.    If you are using this for prolonged periods of time to control flare-ups, return to clinic for re-evaluation of treatment.    DRY SKIN MANAGEMENT INSTRUCTIONS  Routine use of moisturizer is important for healthy, resilient skin not just for soft skin.     Sealing in moisture    Twice daily use of a moisturizer such as over-the-counter (OTC) CeraVe moisturizer cream (in the jar). CeraVe products contain ceramides and filaggrin proteins that can help to maintain the body's moisture layer.    After cleansing or washing, always apply moisturizer immediately after drying off (pat dry only) for best effect.    Protection while hydrating    Do not overuse soap. Unless you have been sweating extensively, just apply soap to groin and armpits.    Recommended products for body include: OTC unscented Dove for sensitive skin or OTC Vanicream cleansing bar.    Recommended facial cleansers include: OTC CeraVe hydrating facial cleanser or OTC Cetaphil daily facial cleanser.    Avoid use of    Scented/perfumed products    Irritating clothing (wool, new jeans, new/unwashed clothing, scratchy synthetics)    Neosporin or triple antibiotic topical products    Products containing aloe, herbs, Vitamin E, or other \"natural ingredients\".    Dryer sheets or fabric softeners (while symptoms are present)    If a topical medication is prescribed, apply topical prescription first, followed by use of moisturizing product.        PROCEDURES:                                                    None.    TT : 20 minutes.  CT : 15 minutes.      The information in this document, created by the medical scribe for me, accurately reflects the services I personally performed and the decisions made by " me. I have reviewed and approved this document for accuracy prior to leaving the patient care area.  November 6, 2018 9:44 AM  Sussy Haq MD  [facility]

## 2018-11-06 NOTE — MR AVS SNAPSHOT
After Visit Summary   11/6/2018    Salma Lew    MRN: [id number]           Patient Information     Date Of Birth          1955        Visit Information        Provider Department      11/6/2018 9:40 AM Sussy Haq [place] [facility] [place]'s Diagnoses     Prurigo nodularis    -  1      Care Instructions    FUTURE APPOINTMENTS  Follow up for a full-body skin cancer screening.  Follow up as needed if itchiness persists.    Avoid scratching.    TOPICAL STEROID INSTRUCTIONS  Triamcinolone 0.1% cream.  1. Wash hands before applying topical steroid. Use the adult fingertip unit (FTU) as a guide.    2. Apply sparingly (just enough to rub in) onto affected areas of the left shoulder/back (not to exceed 0.25 FTU per site), two times per day for 2 weeks.  3. Wash off any excess, unused topical steroid.    This higher strength steroid should never be used on face nor groin.    After the initial treatment, topical steroid may be used as needed for flare-ups but only for short-term treatment.    If you are using this for prolonged periods of time to control flare-ups, return to clinic for re-evaluation of treatment.    Keep in mind to also regularly use moisturizer, as this preventative measure can help maintain your skin's natural protective moisture barrier.    TOPICAL STEROID INSTRUCTIONS  Fluocinonide 0.05% solution.    Apply a few drops and rub into the affected areas on the scalp, at bedtime for 7 nights.    Never to be used on face or groin.    After the initial treatment, topical steroid may be used as needed for flare-ups but only for short-term treatment.    If you are using this for prolonged periods of time to control flare-ups, return to clinic for re-evaluation of treatment.  You may use your previous betamethasone dipropionate 0.05% lotion instead if you still have that at home.    DRY SKIN MANAGEMENT INSTRUCTIONS  Routine use of moisturizer is important for  "healthy, resilient skin not just for soft skin.     Sealing in moisture    Twice daily use of a moisturizer such as over-the-counter (OTC) CeraVe moisturizer cream (in the jar). CeraVe products contain ceramides and filaggrin proteins that can help to maintain the body's moisture layer.    After cleansing or washing, always apply moisturizer immediately after drying off (pat dry only) for best effect.    Protection while hydrating    Do not overuse soap. Unless you have been sweating extensively, just apply soap to groin and armpits.    Recommended products for body include: OTC unscented Dove for sensitive skin or OTC Vanicream cleansing bar.    Recommended facial cleansers include: OTC CeraVe hydrating facial cleanser or OTC Cetaphil daily facial cleanser.    Avoid use of    Scented/perfumed products    Irritating clothing (wool, new jeans, new/unwashed clothing, scratchy synthetics)    Neosporin or triple antibiotic topical products    Products containing aloe, herbs, Vitamin E, or other \"natural ingredients\".    Dryer sheets or fabric softeners (while symptoms are present)    If a topical medication is prescribed, apply topical prescription first, followed by use of moisturizing product.          Follow-ups after your visit        Who to contact     If you have questions or need follow up information about today's clinic visit or your schedule please contact [facility] directly at [phone].  Normal or non-critical lab and imaging results will be communicated to you by MyChart, letter or phone within 4 business days after the clinic has received the results. If you do not hear from us within 7 days, please contact the clinic through MyChart or phone. If you have a critical or abnormal lab result, we will notify you by phone as soon as possible.  Submit refill requests through [employer] or call your pharmacy and they will forward the refill request to us. Please allow 3 business days for your " refill to be completed.          Additional Information About Your Visit        [employer] Information     [employer] gives you secure access to your electronic health record. If you see a primary care provider, you can also send messages to your care team and make appointments. If you have questions, please call your primary care clinic.  If you do not have a primary care provider, please call [phone] and they will assist you.        Care EveryWhere ID     This is your Care EveryWhere ID. This could be used by other organizations to access your [facility] medical records  [id number]        Your Vitals Were     Pulse                   78            Blood Pressure from Last 3 Encounters:   11/06/18 120/78   05/11/18 120/84   04/16/18 113/73    Weight from Last 3 Encounters:   05/11/18 128 lb (58.1 kg)   04/16/18 128 lb (58.1 kg)              Today, you had the following     No orders found for display         Today's Medication Changes          These changes are accurate as of 11/6/18  9:55 AM.  If you have any questions, ask your nurse or doctor.               Start taking these medicines.        Dose/Directions    fluocinonide 0.05 % solution   Commonly known as:  LIDEX   Used for:  Prurigo nodularis   Started by:  Sussy Haq MD        Apply to AA on scalp q hs when needed   Quantity:  60 mL   Refills:  0       triamcinolone 0.1 % cream   Commonly known as:  KENALOG   Used for:  Prurigo nodularis   Started by:  Sussy Haq MD        Apply topically 2 times daily to affected areas on back/shoulder for 10-14 days. Not for use on face nor groin.   Quantity:  30 g   Refills:  1            Where to get your medicines      These medications were sent to [facility] [address] - [address] AT [facility] [address] [address]     Phone:  [phone]     fluocinonide 0.05 % solution    triamcinolone 0.1 % cream                Primary  Care Provider Office Phone # Fax #    Saqib Guajardo [phone] [address] [address]        Equal Access to Services     DOUGLAS MUÑIZ : Hadii aad ku hadnighato Soomaali, waaxda luqadaha, qaybta kaalmada adedeepak, alfonzo mendozathania cardozo. So [facility] [phone].    ATENCIÓN: Si habla español, tiene a calvert disposición servicios gratuitos de asistencia lingüística. Llame al [phone].    We comply with applicable federal civil rights laws and [place] laws. We do not discriminate on the basis of race, color, national origin, age, disability, sex, sexual orientation, or gender identity.            Thank you!     Thank you for choosing [facility]  for your care. Our goal is always to provide you with excellent care. Hearing back from our patients is one way we can continue to improve our services. Please take a few minutes to complete the written survey that you may receive in the mail after your visit with us. Thank you!             Your Updated Medication List - Protect others around you: Learn how to safely use, store and throw away your medicines at www.disposemymeds.org.          This list is accurate as of 11/6/18  9:55 AM.  Always use your most recent med list.                   Brand Name Dispense Instructions for use Diagnosis    albuterol 108 (90 Base) MCG/ACT inhaler    PROAIR HFA/PROVENTIL HFA/VENTOLIN HFA    1 Inhaler    Inhale 2 puffs into the lungs every 6 hours as needed for shortness of breath / dyspnea or wheezing    Mild intermittent asthma without complication       AMOXICILLIN PO      For Adult Acne-PRN        betamethasone dipropionate 0.05 % lotion    DIPROSONE    60 mL    Apply topically to the affected area of the head and ears up to twice daily    Eczema, unspecified type       citalopram 40 MG tablet    celeXA    90 tablet    TAKE 1 BY MOUTH DAILY    Anxiety       clindamycin 1 % lotion    CLEOCIN T           fluocinolone 0.01 % solution    SYNALAR     Apply topically 2 times daily    Psoriasis       fluocinonide 0.05 % solution    LIDEX    60 mL    Apply to AA on scalp q hs when needed    Prurigo nodularis       levothyroxine 50 MCG tablet    SYNTHROID/LEVOTHROID    90 tablet    Take 1 tablet (50 mcg) by mouth daily    Acquired hypothyroidism       triamcinolone 0.1 % cream    KENALOG    30 g    Apply topically 2 times daily to affected areas on back/shoulder for 10-14 days. Not for use on face nor groin.    Prurigo nodularis       zolpidem 10 MG tablet    AMBIEN    30 tablet    Take 1 tablet (10 mg) by mouth nightly as needed for sleep    Insomnia, unspecified type

## 2018-11-06 NOTE — LETTER
11/6/2018         RE: Salma Lew  [address] [facility] [address]        Dear Colleague,    Thank you for referring your patient, Salma Lew, to the [facility]. Please see a copy of my visit note below.    [facility] - PRIMARY CARE SKIN    CC : Lesion(s)  SUBJECTIVE:                                                    Salma Lew is a 63 year old female who presents to clinic today because of spots on the back and scalp.    Bothersome lesions noticed by the patient or other skin concerns :  Issue One : Lesion on left posterior shoulder. She has scratched at this spot.  Onset : 9 months ago.  Enlarging : NO.  Bleeding : NO  Itchy or irritating : YES - they are irritated by friction from clothing. Sweating also provokes irritation. She exercises every day.  Pain or tenderness : NO.  Changing color : NO.  Issue Two : Lesion on right scalp.  Onset : 5 years.  Enlarging : NO.  Bleeding : NO  Itchy or irritating : YES.  Pain or tenderness : YES.  Changing color : NO.    Issue Three : She requests a refill of amoxicillin for adult acne.    Personal history of skin cancer : NO.  Family history of skin cancer : NO.    Personal Medical History  Eczema Psoriasis Autoimmune   ?YES ?YES - sensitivities in ears and on scalp NO     Family Medical History  Eczema Psoriasis Autoimmune   NO YES - in father Rheumatoid arthritis in mother     Sun Exposure History  Previous history of significant sun exposure:  Blistering sunburns : YES  Tanning beds : YES - when younger.  Sunscreen Use : YES, SPF : 20-30.  Sun-protective clothing use : No  Wide-brimmed hats : Yes  Sunglasses : Yes    Occupation : photography (both indoor & outdoor).    Refer to electronic medical record (EMR) for past medical history and medications.    INTEGUMENTARY/SKIN: POSITIVE for non-healing lesion  ROS : 14 point review of systems was negative except the symptoms listed above in the HPI.    This document serves as a record of  the services and decisions personally performed and made by Princess Haq MD. It was created on her behalf by Simba Craft, a trained medical scribe.  The creation of this document is based on the scribe's personal observations and the provider's statements to the medical scribe.  Simba Craft, November 6, 2018 9:44 AM      OBJECTIVE:                                                    GENERAL: healthy, alert and no distress  SKIN: Ellis Skin Type - III.  Scalp, Face, Neck and Trunk were examined. The dermatoscope was used to help evaluate pigmented lesions.  Skin Pertinent Findings:  Left upper back : Two 3 mm in size smooth raised hypopigmented lesions. No suspicious characteristics.    Back : left upper back, multiple areas of excoriation    Scalp : 6 mm smooth area of post-inflammatory erythema with overlying excoriation(s) on the right occipital scalp.    Diagnostic Test Results:  none     MDM : discussed the impact of the scratching at the site for causing skin changes.      ASSESSMENT:                                                      Encounter Diagnosis   Name Primary?     Prurigo nodularis Yes         PLAN:                                                    Patient Instructions   FUTURE APPOINTMENTS  Follow up as needed if itchiness persists.    Avoid scratching.    TOPICAL STEROID INSTRUCTIONS  Triamcinolone 0.1% cream.  1. Wash hands before applying topical steroid. Use the adult fingertip unit (FTU) as a guide.    2. Apply sparingly (just enough to rub in) onto affected areas of the left shoulder/back (not to exceed 0.25 FTU per site), two times per day for 2 weeks.  3. Wash off any excess, unused topical steroid.    This higher strength steroid should never be used on face nor groin.    After the initial treatment, topical steroid may be used as needed for flare-ups but only for short-term treatment.    If you are using this for prolonged periods of time to control flare-ups, return to clinic for  "re-evaluation of treatment.    Keep in mind to also regularly use moisturizer, as this preventative measure can help maintain your skin's natural protective moisture barrier.    TOPICAL STEROID INSTRUCTIONS  Fluocinonide 0.05% solution.    Apply a few drops and rub into the affected areas on the scalp, at bedtime for 7 nights.    Never to be used on face or groin.    After the initial treatment, topical steroid may be used as needed for flare-ups but only for short-term treatment.    If you are using this for prolonged periods of time to control flare-ups, return to clinic for re-evaluation of treatment.    DRY SKIN MANAGEMENT INSTRUCTIONS  Routine use of moisturizer is important for healthy, resilient skin not just for soft skin.     Sealing in moisture    Twice daily use of a moisturizer such as over-the-counter (OTC) CeraVe moisturizer cream (in the jar). CeraVe products contain ceramides and filaggrin proteins that can help to maintain the body's moisture layer.    After cleansing or washing, always apply moisturizer immediately after drying off (pat dry only) for best effect.    Protection while hydrating    Do not overuse soap. Unless you have been sweating extensively, just apply soap to groin and armpits.    Recommended products for body include: OTC unscented Dove for sensitive skin or OTC Vanicream cleansing bar.    Recommended facial cleansers include: OTC CeraVe hydrating facial cleanser or OTC Cetaphil daily facial cleanser.    Avoid use of    Scented/perfumed products    Irritating clothing (wool, new jeans, new/unwashed clothing, scratchy synthetics)    Neosporin or triple antibiotic topical products    Products containing aloe, herbs, Vitamin E, or other \"natural ingredients\".    Dryer sheets or fabric softeners (while symptoms are present)    If a topical medication is prescribed, apply topical prescription first, followed by use of moisturizing product.        PROCEDURES:                            "                         None.    TT : 20 minutes.  CT : 15 minutes.      The information in this document, created by the medical scribe for me, accurately reflects the services I personally performed and the decisions made by me. I have reviewed and approved this document for accuracy prior to leaving the patient care area.  November 6, 2018 9:44 AM  Sussy Haq MD  [facility] [place]    Again, thank you for allowing me to participate in the care of your patient.        Sincerely,        Sussy Haq MD

## 2018-11-06 NOTE — PATIENT INSTRUCTIONS
FUTURE APPOINTMENTS  Follow up for a full-body skin cancer screening.  Follow up as needed if itchiness persists.    Avoid scratching.    TOPICAL STEROID INSTRUCTIONS  Triamcinolone 0.1% cream.  1. Wash hands before applying topical steroid. Use the adult fingertip unit (FTU) as a guide.    2. Apply sparingly (just enough to rub in) onto affected areas of the left shoulder/back (not to exceed 0.25 FTU per site), two times per day for 2 weeks.  3. Wash off any excess, unused topical steroid.    This higher strength steroid should never be used on face nor groin.    After the initial treatment, topical steroid may be used as needed for flare-ups but only for short-term treatment.    If you are using this for prolonged periods of time to control flare-ups, return to clinic for re-evaluation of treatment.    Keep in mind to also regularly use moisturizer, as this preventative measure can help maintain your skin's natural protective moisture barrier.    TOPICAL STEROID INSTRUCTIONS  Fluocinonide 0.05% solution.    Apply a few drops and rub into the affected areas on the scalp, at bedtime for 7 nights.    Never to be used on face or groin.    After the initial treatment, topical steroid may be used as needed for flare-ups but only for short-term treatment.    If you are using this for prolonged periods of time to control flare-ups, return to clinic for re-evaluation of treatment.  You may use your previous betamethasone dipropionate 0.05% lotion instead if you still have that at home.    DRY SKIN MANAGEMENT INSTRUCTIONS  Routine use of moisturizer is important for healthy, resilient skin not just for soft skin.     Sealing in moisture    Twice daily use of a moisturizer such as over-the-counter (OTC) CeraVe moisturizer cream (in the jar). CeraVe products contain ceramides and filaggrin proteins that can help to maintain the body's moisture layer.    After cleansing or washing, always apply moisturizer immediately after  "drying off (pat dry only) for best effect.    Protection while hydrating    Do not overuse soap. Unless you have been sweating extensively, just apply soap to groin and armpits.    Recommended products for body include: OTC unscented Dove for sensitive skin or OTC Vanicream cleansing bar.    Recommended facial cleansers include: OTC CeraVe hydrating facial cleanser or OTC Cetaphil daily facial cleanser.    Avoid use of    Scented/perfumed products    Irritating clothing (wool, new jeans, new/unwashed clothing, scratchy synthetics)    Neosporin or triple antibiotic topical products    Products containing aloe, herbs, Vitamin E, or other \"natural ingredients\".    Dryer sheets or fabric softeners (while symptoms are present)    If a topical medication is prescribed, apply topical prescription first, followed by use of moisturizing product.  "

## 2018-12-10 ENCOUNTER — MYC REFILL (OUTPATIENT)
Dept: FAMILY MEDICINE | Facility: CLINIC | Age: 63
End: 2018-12-10

## 2018-12-10 DIAGNOSIS — E03.9 ACQUIRED HYPOTHYROIDISM: ICD-10-CM

## 2018-12-10 DIAGNOSIS — F41.9 ANXIETY: ICD-10-CM

## 2018-12-11 RX ORDER — CITALOPRAM HYDROBROMIDE 40 MG/1
TABLET ORAL
Qty: 90 TABLET | Refills: 3 | OUTPATIENT
Start: 2018-12-11

## 2018-12-11 RX ORDER — LEVOTHYROXINE SODIUM 50 UG/1
50 TABLET ORAL DAILY
Qty: 90 TABLET | Refills: 3 | OUTPATIENT
Start: 2018-12-11

## 2018-12-11 NOTE — TELEPHONE ENCOUNTER
"Requested Prescriptions   Pending Prescriptions Disp Refills     citalopram (CELEXA) 40 MG tablet 90 tablet 3    Last Written Prescription Date:  7/2/2018  Last Fill Quantity: 90,  # refills: 3   Last office visit: 11/6/2018 with prescribing provider:  Scherman   Future Office Visit:     Sig: TAKE 1 BY MOUTH DAILY    SSRIs Protocol Passed - 12/10/2018 12:16 PM       Passed - Recent (12 mo) or future (30 days) visit within the authorizing provider's specialty    Patient had office visit in the last 12 months or has a visit in the next 30 days with authorizing provider or within the authorizing provider's specialty.  See \"Patient Info\" tab in inbasket, or \"Choose Columns\" in Meds & Orders section of the refill encounter.             Passed - Patient is age 18 or older       Passed - No active pregnancy on record       Passed - No positive pregnancy test in last 12 months        levothyroxine (SYNTHROID/LEVOTHROID) 50 MCG tablet 90 tablet 3    Last Written Prescription Date:  7/2/2018  Last Fill Quantity: 90,  # refills: 3   Last office visit: 11/6/2018 with prescribing provider:  Scherman   Future Office Visit:     Sig: Take 1 tablet (50 mcg) by mouth daily    Thyroid Protocol Passed - 12/10/2018 12:16 PM       Passed - Patient is 12 years or older       Passed - Recent (12 mo) or future (30 days) visit within the authorizing provider's specialty    Patient had office visit in the last 12 months or has a visit in the next 30 days with authorizing provider or within the authorizing provider's specialty.  See \"Patient Info\" tab in inbasket, or \"Choose Columns\" in Meds & Orders section of the refill encounter.             Passed - Normal TSH on file in past 12 months    Recent Labs   Lab Test 04/16/18  0945   TSH 2.27             Passed - No active pregnancy on record    If patient is pregnant or has had a positive pregnancy test, please check TSH.         Passed - No positive pregnancy test in past 12 months    If " patient is pregnant or has had a positive pregnancy test, please check TSH.        Patient has refills remaining at pharmacy.  JO RedN, RN  [facility] [employer] [facility]

## 2018-12-27 DIAGNOSIS — G47.00 INSOMNIA, UNSPECIFIED TYPE: ICD-10-CM

## 2018-12-27 NOTE — TELEPHONE ENCOUNTER
Reason for Call:  Medication or medication refill:    Do you use a [facility]?  Name of the pharmacy and phone number for the current request:       [facility] [address] [facility] [place] - [address] AT [facility] [place] [address]         Name of the medication requested: Zolpidem 10mg    Other request: states she needs today     Can we leave a detailed message on this number? YES    Phone number patient can be reached at: Cell number on file:    Telephone Information:   Mobile [phone]       Best Time: anytime    Call taken on 12/27/2018 at 10:04 AM by Brent Briggs

## 2018-12-27 NOTE — TELEPHONE ENCOUNTER
Requested Prescriptions   Pending Prescriptions Disp Refills     zolpidem (AMBIEN) 10 MG tablet 30 tablet 1     Sig: Take 1 tablet (10 mg) by mouth nightly as needed for sleep    There is no refill protocol information for this order              Last Written Prescription Date:  8/24/18  Last Fill Quantity: 30 tablet,   # refills: 1  Last Office Visit: 4/16/2018 (Casandra)  Future Office visit:       Routing refill request to provider for review/approval because:  Drug not on the [facility], [facility] or [facility] refill protocol or controlled substance

## 2018-12-28 RX ORDER — ZOLPIDEM TARTRATE 10 MG/1
5 TABLET ORAL
Qty: 30 TABLET | Refills: 0 | Status: SHIPPED | OUTPATIENT
Start: 2018-12-28 | End: 2019-03-13

## 2018-12-28 NOTE — TELEPHONE ENCOUNTER
The patient said that if you want to write for 5MG she is ok with that   She was very emotional and said that she really needed the medication yesterday she is going to a  today of her Mother in law and she cant be reached to day but asked if it could be sent this morning please

## 2018-12-28 NOTE — TELEPHONE ENCOUNTER
Left message for pt. Females should not take more than 5mg so I will not write for 10mg nightly. Need to discuss with pt.   DEBRA Lopez CNP

## 2019-03-13 ENCOUNTER — MYC REFILL (OUTPATIENT)
Dept: FAMILY MEDICINE | Facility: CLINIC | Age: 64
End: 2019-03-13

## 2019-03-13 DIAGNOSIS — G47.00 INSOMNIA, UNSPECIFIED TYPE: ICD-10-CM

## 2019-03-15 RX ORDER — ZOLPIDEM TARTRATE 10 MG/1
5 TABLET ORAL
Qty: 30 TABLET | Refills: 0 | Status: SHIPPED | OUTPATIENT
Start: 2019-03-15 | End: 2019-05-28

## 2019-03-15 NOTE — TELEPHONE ENCOUNTER
Zolpidem 10 mg  Last Written Prescription Date:  12/28/2018  Last Fill Quantity: 30,   # refills: 0  Last Office Visit: 11/6/2018  Future Office visit:       Routing refill request to provider for review/approval because:  Drug not on the [facility], [facility] or  Health refill protocol or controlled substance  RN unable to access .  JO SlaughterN, RN  [facility] [facility]

## 2019-05-28 ENCOUNTER — MYC REFILL (OUTPATIENT)
Dept: FAMILY MEDICINE | Facility: CLINIC | Age: 64
End: 2019-05-28

## 2019-05-28 DIAGNOSIS — G47.00 INSOMNIA, UNSPECIFIED TYPE: ICD-10-CM

## 2019-05-28 DIAGNOSIS — L28.1 PRURIGO NODULARIS: ICD-10-CM

## 2019-05-28 RX ORDER — TRIAMCINOLONE ACETONIDE 1 MG/G
CREAM TOPICAL 2 TIMES DAILY
Qty: 30 G | Refills: 1 | Status: SHIPPED | OUTPATIENT
Start: 2019-05-28 | End: 2021-09-24

## 2019-05-28 NOTE — TELEPHONE ENCOUNTER
Requested Prescriptions   Pending Prescriptions Disp Refills     zolpidem (AMBIEN) 10 MG tablet 30 tablet 0     Sig: Take 0.5 tablets (5 mg) by mouth nightly as needed for sleep       There is no refill protocol information for this order            Last Written Prescription Date:  3/15/19  Last Fill Quantity: 30 tablet,   # refills: 0  Last Office Visit: 4/16/2018 (Csaandra)  Future Office visit:       Routing refill request to provider for review/approval because:  Drug not on the [facility], [facility] or [facility] refill protocol or controlled substance

## 2019-05-28 NOTE — TELEPHONE ENCOUNTER
"Requested Prescriptions   Pending Prescriptions Disp Refills     triamcinolone (KENALOG) 0.1 % external cream  Last Written Prescription Date:  11-6-2018  Last Fill Quantity: 30 g,  # refills: 1   Last office visit: 11/6/2018 with prescribing provider:     Future Office Visit:     30 g 1     Sig: Apply topically 2 times daily to affected areas on back/shoulder for 10-14 days. Not for use on face nor groin.       Topical Steroids and Nonsteroidals Protocol Passed - 5/28/2019  7:56 AM        Passed - Patient is age 6 or older        Passed - Authorizing prescriber's most recent note related to this medication read.     If refill request is for ophthalmic use, please forward request to provider for approval.          Passed - High potency steroid not ordered        Passed - Recent (12 mo) or future (30 days) visit within the authorizing provider's specialty     Patient had office visit in the last 12 months or has a visit in the next 30 days with authorizing provider or within the authorizing provider's specialty.  See \"Patient Info\" tab in inbasket, or \"Choose Columns\" in Meds & Orders section of the refill encounter.              Passed - Medication is active on med list          "

## 2019-05-29 RX ORDER — ZOLPIDEM TARTRATE 10 MG/1
5 TABLET ORAL
Qty: 30 TABLET | Refills: 0 | Status: SHIPPED | OUTPATIENT
Start: 2019-05-29 | End: 2019-08-06

## 2019-08-06 ENCOUNTER — MYC REFILL (OUTPATIENT)
Dept: FAMILY MEDICINE | Facility: CLINIC | Age: 64
End: 2019-08-06

## 2019-08-06 DIAGNOSIS — G47.00 INSOMNIA, UNSPECIFIED TYPE: ICD-10-CM

## 2019-08-07 ENCOUNTER — THERAPY VISIT (OUTPATIENT)
Dept: PHYSICAL THERAPY | Facility: CLINIC | Age: 64
End: 2019-08-07
Payer: COMMERCIAL

## 2019-08-07 DIAGNOSIS — G89.29 CHRONIC PAIN OF BOTH SHOULDERS: ICD-10-CM

## 2019-08-07 DIAGNOSIS — M25.511 CHRONIC PAIN OF BOTH SHOULDERS: ICD-10-CM

## 2019-08-07 DIAGNOSIS — R20.0 NUMBNESS AND TINGLING OF BOTH UPPER EXTREMITIES: Primary | ICD-10-CM

## 2019-08-07 DIAGNOSIS — R20.2 NUMBNESS AND TINGLING OF BOTH UPPER EXTREMITIES: Primary | ICD-10-CM

## 2019-08-07 DIAGNOSIS — M25.512 CHRONIC PAIN OF BOTH SHOULDERS: ICD-10-CM

## 2019-08-07 PROCEDURE — 97161 PT EVAL LOW COMPLEX 20 MIN: CPT | Mod: GP | Performed by: PHYSICAL THERAPIST

## 2019-08-07 PROCEDURE — 97110 THERAPEUTIC EXERCISES: CPT | Mod: GP | Performed by: PHYSICAL THERAPIST

## 2019-08-07 NOTE — PROGRESS NOTES
[facility] Initial Evaluation  Subjective:Salma Lew is a 64 year old female.    Patient s chief complaints: Reports increase in tingling in bilateral forearms. Reports has had massage but no change. Reports has had tightness in upper back and shoulders for many years but not the NT. Active with Yoga, biking, gardening, shoveling in garden, pruning.     Condition occurred due to insidious onset.  Date of Onset: MD visit 7/26/19  Location of symptoms is tightness in upper back and neck.  .  Symptoms other than pain include: NT bilateral UE  Quality of pain is NT and tightness and frequency is constant varying degrees.    Pain dependence on time of day is: tight in morning, tight end of day if busy day.   Pain rating is: no pain.    Symptoms are exacerbated by: gardening.    Symptoms are relieved by:  nothing.    Progression of symptoms is that symptoms are:  More aware of it.  Imaging/Special tests include: nothing   Previous treatments include: massages.   Patient reports that general health is: excellent.   Pertinent medical history includes:  Asthma - exercise induced,.    Medical allergies includes: neosporin, frongronus(what it looked like was spelled on form).   Surgical history includes: none.  Current medications include: anti-depressant, sleep, thryroid  Current occupation is: self-photogropher  Work status is: own hours  Primary job tasks include: computer work, holding camera  Barriers include: none  Red flags include: none    Patient's expectations for therapy include: Decrease numbness/tingling in upper extremities.    The history is provided by the patient. No  was used.                       Objective:  System              Cervical/Thoracic Evaluation    AROM:  AROM Cervical:    Flexion:            50 degrees  Extension:       45 degrees  Rotation:         Left: 50 degrees     Right: 40 degrees pain on the right with stretching  Side Bend:      Left: 30 degrees  pain left side of neck and down arm     Right:  30 degrees  AROM Thoracic:    Flexion:              Extension:           Rotation:            Left: 30 degrees     Right: 20 degrees      Headaches: none  Cervical Myotomes:    C1-2 (Neck Flex): Left:  5    Right: 5  C3 (neck side bend): Left: 5    Right: 5  C4 (shrug):  Left: 5    Right: 5  C5 (Deltoid):  Left: 3    Right: 3  C6 (Biceps):  Left: 5    Right: 5  C7 (Triceps):  Left: 5    Right: 5  C8 (Thumb Ext): Left: 4    Right: 4  T1 (Intrinsics): Left: 5    Right: 5  DTR's:  not assessed        Neural Tension:    Left side:  Radial; Ulnar and Median positive.  Right side:  Radial; Ulnar and Median positive.  Cervical Dermatomes:  Cervical dermatomes: normal per patient.                          Spinal Segmental Conclusions:  Dural tension    Elevated 1st rib bilaterally    Minimal facet and rib head movement bilaterally T1-8     Trunk SBL fingertip to knee pain in low back, SBR pain right SI.     Sacrum flared.         Cord Sign:  normal                                            General     ROS    Assessment/Plan:    Patient is a 64 year old female with both sides shoulder, both sides wrist/hand and bilateral UE NT complaints.    Patient has the following significant findings with corresponding treatment plan.                Diagnosis 1:  Bilateral shoulder/wrist pain  Pain -  hot/cold therapy, manual therapy, self management, education and home program  Decreased ROM/flexibility - manual therapy, therapeutic exercise, therapeutic activity and home program  Decreased strength - therapeutic exercise, therapeutic activities and home program  Impaired posture - neuro re-education, therapeutic activities and home program  Diagnosis 2:  Bilateral UE NT   Pain -  hot/cold therapy, manual therapy, self management, education and home program  Impaired posture - neuro re-education, therapeutic activities and home program  NT - hot/cold, manual therapy, HEP    Therapy  Evaluation Codes:   1) History comprised of:   Personal factors that impact the plan of care:      Past/current experiences and Time since onset of symptoms.    Comorbidity factors that impact the plan of care are:      Asthma.     Medications impacting care: Anti-depressant, Sleep and thyroid.  2) Examination of Body Systems comprised of:   Body structures and functions that impact the plan of care:      Cervical spine, Shoulder and Wrist.   Activity limitations that impact the plan of care are:      Lifting, Reading/Computer work, Sports, Throwing and Sleeping.  3) Clinical presentation characteristics are:   Stable/Uncomplicated.  4) Decision-Making    Low complexity using standardized patient assessment instrument and/or measureable assessment of functional outcome.  Cumulative Therapy Evaluation is: Low complexity.    Previous and current functional limitations:  (See Goal Flow Sheet for this information)    Short term and Long term goals: (See Goal Flow Sheet for this information)     Communication ability:  Patient appears to be able to clearly communicate and understand verbal and written communication and follow directions correctly.  Treatment Explanation - The following has been discussed with the patient:   RX ordered/plan of care  Anticipated outcomes  Possible risks and side effects  This patient would benefit from PT intervention to resume normal activities.   Rehab potential is good.    Frequency:  1 X week, once daily  Duration:  for 10 weeks  Discharge Plan:  Achieve all LTG.  Independent in home treatment program.  Reach maximal therapeutic benefit.    Please refer to the daily flowsheet for treatment today, total treatment time and time spent performing 1:1 timed codes.

## 2019-08-08 RX ORDER — ZOLPIDEM TARTRATE 10 MG/1
5 TABLET ORAL
Qty: 15 TABLET | Refills: 0 | Status: SHIPPED | OUTPATIENT
Start: 2019-08-08 | End: 2019-10-11

## 2019-08-08 NOTE — TELEPHONE ENCOUNTER
Rx Ready- Faxed to Leigh Elias [id number]  [facility] Unit Coordinator

## 2019-08-08 NOTE — TELEPHONE ENCOUNTER
Patient saw  once, April 2018.  Notified that she is due for an office visit.    Pended one month supply of Zolpidem for review.  Print if appropriate.    Thank you,  Tammie Pruett RN on 8/8/2019 at 9:17 AM

## 2019-08-13 PROBLEM — R20.0 NUMBNESS AND TINGLING OF BOTH UPPER EXTREMITIES: Status: ACTIVE | Noted: 2019-08-13

## 2019-08-13 PROBLEM — M25.511 BILATERAL SHOULDER PAIN: Status: ACTIVE | Noted: 2019-08-13

## 2019-08-13 PROBLEM — M25.512 BILATERAL SHOULDER PAIN: Status: ACTIVE | Noted: 2019-08-13

## 2019-08-13 PROBLEM — R20.2 NUMBNESS AND TINGLING OF BOTH UPPER EXTREMITIES: Status: ACTIVE | Noted: 2019-08-13

## 2019-08-14 ENCOUNTER — THERAPY VISIT (OUTPATIENT)
Dept: PHYSICAL THERAPY | Facility: CLINIC | Age: 64
End: 2019-08-14
Payer: COMMERCIAL

## 2019-08-14 DIAGNOSIS — M25.512 CHRONIC PAIN OF BOTH SHOULDERS: ICD-10-CM

## 2019-08-14 DIAGNOSIS — M25.511 CHRONIC PAIN OF BOTH SHOULDERS: ICD-10-CM

## 2019-08-14 DIAGNOSIS — G89.29 CHRONIC PAIN OF BOTH SHOULDERS: ICD-10-CM

## 2019-08-14 DIAGNOSIS — R20.2 NUMBNESS AND TINGLING OF BOTH UPPER EXTREMITIES: Primary | ICD-10-CM

## 2019-08-14 DIAGNOSIS — R20.0 NUMBNESS AND TINGLING OF BOTH UPPER EXTREMITIES: Primary | ICD-10-CM

## 2019-08-14 PROCEDURE — 97140 MANUAL THERAPY 1/> REGIONS: CPT | Mod: GP | Performed by: PHYSICAL THERAPIST

## 2019-08-14 PROCEDURE — 97110 THERAPEUTIC EXERCISES: CPT | Mod: GP | Performed by: PHYSICAL THERAPIST

## 2019-08-14 PROCEDURE — 97112 NEUROMUSCULAR REEDUCATION: CPT | Mod: GP | Performed by: PHYSICAL THERAPIST

## 2019-08-22 ENCOUNTER — THERAPY VISIT (OUTPATIENT)
Dept: PHYSICAL THERAPY | Facility: CLINIC | Age: 64
End: 2019-08-22
Payer: COMMERCIAL

## 2019-08-22 DIAGNOSIS — M25.511 CHRONIC PAIN OF BOTH SHOULDERS: ICD-10-CM

## 2019-08-22 DIAGNOSIS — G89.29 CHRONIC PAIN OF BOTH SHOULDERS: ICD-10-CM

## 2019-08-22 DIAGNOSIS — R20.0 NUMBNESS AND TINGLING OF BOTH UPPER EXTREMITIES: Primary | ICD-10-CM

## 2019-08-22 DIAGNOSIS — R20.2 NUMBNESS AND TINGLING OF BOTH UPPER EXTREMITIES: Primary | ICD-10-CM

## 2019-08-22 DIAGNOSIS — M25.512 CHRONIC PAIN OF BOTH SHOULDERS: ICD-10-CM

## 2019-08-22 PROCEDURE — 97140 MANUAL THERAPY 1/> REGIONS: CPT | Mod: GP | Performed by: PHYSICAL THERAPIST

## 2019-08-22 PROCEDURE — 97110 THERAPEUTIC EXERCISES: CPT | Mod: GP | Performed by: PHYSICAL THERAPIST

## 2019-08-22 PROCEDURE — 97112 NEUROMUSCULAR REEDUCATION: CPT | Mod: GP | Performed by: PHYSICAL THERAPIST

## 2019-08-30 ENCOUNTER — THERAPY VISIT (OUTPATIENT)
Dept: PHYSICAL THERAPY | Facility: CLINIC | Age: 64
End: 2019-08-30
Payer: COMMERCIAL

## 2019-08-30 DIAGNOSIS — M25.512 CHRONIC PAIN OF BOTH SHOULDERS: ICD-10-CM

## 2019-08-30 DIAGNOSIS — M25.511 CHRONIC PAIN OF BOTH SHOULDERS: ICD-10-CM

## 2019-08-30 DIAGNOSIS — G89.29 CHRONIC PAIN OF BOTH SHOULDERS: ICD-10-CM

## 2019-08-30 DIAGNOSIS — R20.2 NUMBNESS AND TINGLING OF BOTH UPPER EXTREMITIES: Primary | ICD-10-CM

## 2019-08-30 DIAGNOSIS — R20.0 NUMBNESS AND TINGLING OF BOTH UPPER EXTREMITIES: Primary | ICD-10-CM

## 2019-08-30 PROCEDURE — 97110 THERAPEUTIC EXERCISES: CPT | Mod: GP | Performed by: PHYSICAL THERAPIST

## 2019-08-30 PROCEDURE — 97112 NEUROMUSCULAR REEDUCATION: CPT | Mod: GP | Performed by: PHYSICAL THERAPIST

## 2019-08-30 PROCEDURE — 97140 MANUAL THERAPY 1/> REGIONS: CPT | Mod: GP | Performed by: PHYSICAL THERAPIST

## 2019-09-03 ENCOUNTER — THERAPY VISIT (OUTPATIENT)
Dept: PHYSICAL THERAPY | Facility: CLINIC | Age: 64
End: 2019-09-03
Payer: COMMERCIAL

## 2019-09-03 DIAGNOSIS — R20.0 NUMBNESS AND TINGLING OF BOTH UPPER EXTREMITIES: Primary | ICD-10-CM

## 2019-09-03 DIAGNOSIS — R20.2 NUMBNESS AND TINGLING OF BOTH UPPER EXTREMITIES: Primary | ICD-10-CM

## 2019-09-03 DIAGNOSIS — M25.512 CHRONIC PAIN OF BOTH SHOULDERS: ICD-10-CM

## 2019-09-03 DIAGNOSIS — M25.511 CHRONIC PAIN OF BOTH SHOULDERS: ICD-10-CM

## 2019-09-03 DIAGNOSIS — G89.29 CHRONIC PAIN OF BOTH SHOULDERS: ICD-10-CM

## 2019-09-03 PROCEDURE — 97110 THERAPEUTIC EXERCISES: CPT | Mod: GP | Performed by: PHYSICAL THERAPIST

## 2019-09-03 PROCEDURE — 97112 NEUROMUSCULAR REEDUCATION: CPT | Mod: GP | Performed by: PHYSICAL THERAPIST

## 2019-09-03 PROCEDURE — 97140 MANUAL THERAPY 1/> REGIONS: CPT | Mod: GP | Performed by: PHYSICAL THERAPIST

## 2019-09-10 ENCOUNTER — MYC REFILL (OUTPATIENT)
Dept: FAMILY MEDICINE | Facility: CLINIC | Age: 64
End: 2019-09-10

## 2019-09-10 ENCOUNTER — OFFICE VISIT (OUTPATIENT)
Dept: FAMILY MEDICINE | Facility: CLINIC | Age: 64
End: 2019-09-10
Payer: COMMERCIAL

## 2019-09-10 ENCOUNTER — THERAPY VISIT (OUTPATIENT)
Dept: PHYSICAL THERAPY | Facility: CLINIC | Age: 64
End: 2019-09-10
Payer: COMMERCIAL

## 2019-09-10 VITALS — DIASTOLIC BLOOD PRESSURE: 84 MMHG | TEMPERATURE: 98.6 F | SYSTOLIC BLOOD PRESSURE: 125 MMHG

## 2019-09-10 DIAGNOSIS — R20.0 NUMBNESS AND TINGLING OF BOTH UPPER EXTREMITIES: Primary | ICD-10-CM

## 2019-09-10 DIAGNOSIS — M25.512 CHRONIC PAIN OF BOTH SHOULDERS: ICD-10-CM

## 2019-09-10 DIAGNOSIS — E03.9 ACQUIRED HYPOTHYROIDISM: ICD-10-CM

## 2019-09-10 DIAGNOSIS — R20.2 NUMBNESS AND TINGLING OF BOTH UPPER EXTREMITIES: Primary | ICD-10-CM

## 2019-09-10 DIAGNOSIS — M25.511 CHRONIC PAIN OF BOTH SHOULDERS: ICD-10-CM

## 2019-09-10 DIAGNOSIS — G89.29 CHRONIC PAIN OF BOTH SHOULDERS: ICD-10-CM

## 2019-09-10 DIAGNOSIS — Z71.84 COUNSELING ABOUT TRAVEL: Primary | ICD-10-CM

## 2019-09-10 DIAGNOSIS — F41.9 ANXIETY: ICD-10-CM

## 2019-09-10 PROCEDURE — 90717 YELLOW FEVER VACCINE SUBQ: CPT | Performed by: FAMILY MEDICINE

## 2019-09-10 PROCEDURE — 97110 THERAPEUTIC EXERCISES: CPT | Mod: GP | Performed by: PHYSICAL THERAPIST

## 2019-09-10 PROCEDURE — 90691 TYPHOID VACCINE IM: CPT | Performed by: FAMILY MEDICINE

## 2019-09-10 PROCEDURE — 90472 IMMUNIZATION ADMIN EACH ADD: CPT | Performed by: FAMILY MEDICINE

## 2019-09-10 PROCEDURE — 90715 TDAP VACCINE 7 YRS/> IM: CPT | Performed by: FAMILY MEDICINE

## 2019-09-10 PROCEDURE — 97140 MANUAL THERAPY 1/> REGIONS: CPT | Mod: GP | Performed by: PHYSICAL THERAPIST

## 2019-09-10 PROCEDURE — 99402 PREV MED CNSL INDIV APPRX 30: CPT | Mod: 25 | Performed by: FAMILY MEDICINE

## 2019-09-10 PROCEDURE — 90471 IMMUNIZATION ADMIN: CPT | Performed by: FAMILY MEDICINE

## 2019-09-10 PROCEDURE — 97112 NEUROMUSCULAR REEDUCATION: CPT | Mod: GP | Performed by: PHYSICAL THERAPIST

## 2019-09-10 RX ORDER — ATOVAQUONE AND PROGUANIL HYDROCHLORIDE 250; 100 MG/1; MG/1
TABLET, FILM COATED ORAL
Qty: 25 TABLET | Refills: 0 | Status: SHIPPED | OUTPATIENT
Start: 2019-09-10 | End: 2019-11-22

## 2019-09-10 RX ORDER — AZITHROMYCIN 500 MG/1
TABLET, FILM COATED ORAL
Qty: 3 TABLET | Refills: 0 | Status: SHIPPED | OUTPATIENT
Start: 2019-09-10 | End: 2019-11-22

## 2019-09-10 NOTE — PROGRESS NOTES
Itinerary:  [place] and [place]     Departure Date: 10/23/2019    Return Date: 11/20/2019    Length of Trip: about 1 month     Purpose of Trip: pleasure    Urban/Rural: both     Accommodations: hotels     IMMUNIZATION HISTORY  Have you received any immunizations within the past 4 weeks?  No  Have you ever fainted from having your blood drawn or from an injection?  No  Have you ever had a fever reaction to vaccination?  No  Have you ever had any bad reaction or side effect from any vaccination?  No  Have you ever had hepatitis A or B vaccine?  unknown  Do you live (or work closely) with anyone who has AIDS, an AIDS-like condition, any other immune disorder or who is on chemotherapy for cancer or a   family history of immunodeficiency?  No  Have you received any injection of immune globulin or any blood products during the past 12 months?  No    Patient roomed by TASHA Brown     Special medical concerns: none    /84   Temp 98.6  F (37  C) (Oral)   EXAM: deferred    Stamaril Informed Consent    The patient was provided with a copy of the IRB-approved consent form and all questions were answered before the patient agreed to participate by signing the informed consent document.   A copy of the form was provided to the patient.    Date: 9/10/19  Consent Version Date: 5/10/17  Consent Obtained by:  PIERRE  HIPAA:  Yes  HIPAA Authorization Signed Date: 9/10/19      Inclusion/Exclusion Criteria:    (Similar to Yellow Fever-VAX)      The patient met all of the following inclusion criteria in order to be eligible for the Stamaril vaccination under this EAP (Expanded Access Investigational New Drug Program)           At increased risk for YF, including researchers, laboratory workers, vaccine production staff, and those who are traveling within 30 days to a YF-endemic region or to a country requiring proof of YF vaccination under IHRs (International Health Regulations)?       Yes     Patient is greater than or equal to 9  months of age on the day of vaccination?     Yes     Patient is greater than or equal to 18 years of age and signed and dated the Consent Forms?     Yes     Patient is < 18 years of age and parent(s)/guardian(s) signed and dated the Consent Forms?      Patient is 7 years to < 18 years of age and signed and dated the Assent form?        No Assent is required.  Patient is <7 years of age.     No      No      N/A     The patient did not meet any of the following criteria that would have excluded the patient from receiving the Stamaril vaccination under this EAP              Patient is less than 9 months of age.       No     The patient is breast-feeding and cannot stop nursing for at least 14 days after vaccination.    Note: Yellow Fever vaccine virus may be transmissible via breast milk by nursing mothers who are vaccinated during the final 2 weeks of pregnancy or post-partum.   Following transmission, infants may develop encephalitis.  The minimum time of discontinuation of breastfeeding for 14 days after vaccination is based on the expected clearance of live-attenuated vaccine virus.       No     The patient is immunosuppressed, whether congenital or idiopathic, including for example, leukemia, lymphoma, other malignancies, and patients who are receiving immunosuppressant medications (e.g. Systemic corticosteroids [greater than the standard dose of topical or inhaled steroids], alkylating drugs, antimetabolites, of other cytotoxic or immunomodulatory drugs) or radiation therapy or organ transplantation.       No     The patient has known hypersensitivity to the active substance or to any of the excipients of Stamaril vaccine or to eggs or chicken proteins.     No     The patient is symptomatic for human immunodeficiency virus (HIV) infection     No     The patient is asymptomatic for HIV infection but accompanied by evidence of severe immune suppression    Note:  Evidence of severe immune suppression includes CD4+  T-cell counts < 200 cubic millimeters (or < 15% total lymphocytes in children aged < 6 years), or as determined by the health care provider.       No     The patient has a history of thymus dysfunction (including myasthenia gravis, thymoma, thymectomy)     No     Moderate or severe febrile illness or acute illness    Note: Participation in the EAP can be reassessed when moderate or severe febrile illness or acute illness has resolved.       No           Immunizations discussed include: Typhoid, Yellow Fever, Tetanus/Diphtheria and   Discussed hep A however it has neosporin and patient has allergy to the medication  Malaraia prophylaxis recommended: Malarone  Symptomatic treatment for traveler's diarrhea: azithromycin    ASSESSMENT/PLAN:  1. Counseling about travel     - atovaquone-proguanil (MALARONE) 250-100 MG tablet; Take one tablet daily, start 2 days prior to travel to malaria area and continue daily while there and for 7 days after leaving malaria area  Dispense: 25 tablet; Refill: 0  - azithromycin (ZITHROMAX) 500 MG tablet; Take one tablet daily for up to 3 days as needed for traveler's diarrhea  Dispense: 3 tablet; Refill: 0  I have reviewed general recommendations for safe travel   including: food/water precautions, insect avoidance,   roadway safety. Educational materials and [employer] report provided.    Total visit time 30 minutes with over 50% of time spent counseling patient.

## 2019-09-11 RX ORDER — LEVOTHYROXINE SODIUM 50 UG/1
50 TABLET ORAL DAILY
Qty: 30 TABLET | Refills: 0 | Status: SHIPPED | OUTPATIENT
Start: 2019-09-11 | End: 2019-11-22

## 2019-09-11 RX ORDER — CITALOPRAM HYDROBROMIDE 40 MG/1
TABLET ORAL
Qty: 30 TABLET | Refills: 0 | Status: SHIPPED | OUTPATIENT
Start: 2019-09-11 | End: 2019-11-22

## 2019-09-11 NOTE — TELEPHONE ENCOUNTER
"Medication is being filled for 1 time refill only due to:  Patient needs to be seen because it has been more than one year since last visit.   [email or web address] sent to pt  Lucila VELAZQUEZ RN    Last Written Prescription Date:  7/2/2018  Last Fill Quantity: 90,  # refills: 3   Last office visit: 9/10/2019 with prescribing provider:     Future Office Visit:    Requested Prescriptions   Pending Prescriptions Disp Refills     citalopram (CELEXA) 40 MG tablet 90 tablet 3     Sig: TAKE 1 BY MOUTH DAILY       SSRIs Protocol Failed - 9/10/2019  2:59 PM        Failed - Recent (12 mo) or future (30 days) visit within the authorizing provider's specialty     Patient had office visit in the last 12 months or has a visit in the next 30 days with authorizing provider or within the authorizing provider's specialty.  See \"Patient Info\" tab in inbasket, or \"Choose Columns\" in Meds & Orders section of the refill encounter.              Passed - Medication is active on med list        Passed - Patient is age 18 or older        Passed - No active pregnancy on record        Passed - No positive pregnancy test in last 12 months        levothyroxine (SYNTHROID/LEVOTHROID) 50 MCG tablet 90 tablet 3     Sig: Take 1 tablet (50 mcg) by mouth daily       Thyroid Protocol Failed - 9/10/2019  2:59 PM        Failed - Recent (12 mo) or future (30 days) visit within the authorizing provider's specialty     Patient had office visit in the last 12 months or has a visit in the next 30 days with authorizing provider or within the authorizing provider's specialty.  See \"Patient Info\" tab in inbasket, or \"Choose Columns\" in Meds & Orders section of the refill encounter.              Failed - Normal TSH on file in past 12 months     Recent Labs   Lab Test 04/16/18  0945   TSH 2.27              Passed - Patient is 12 years or older        Passed - Medication is active on med list        Passed - No active pregnancy on record     If patient is pregnant or has had a " positive pregnancy test, please check TSH.          Passed - No positive pregnancy test in past 12 months     If patient is pregnant or has had a positive pregnancy test, please check TSH.

## 2019-09-17 ENCOUNTER — THERAPY VISIT (OUTPATIENT)
Dept: PHYSICAL THERAPY | Facility: CLINIC | Age: 64
End: 2019-09-17
Payer: COMMERCIAL

## 2019-09-17 DIAGNOSIS — M25.511 CHRONIC PAIN OF BOTH SHOULDERS: ICD-10-CM

## 2019-09-17 DIAGNOSIS — R20.0 NUMBNESS AND TINGLING OF BOTH UPPER EXTREMITIES: Primary | ICD-10-CM

## 2019-09-17 DIAGNOSIS — G89.29 CHRONIC PAIN OF BOTH SHOULDERS: ICD-10-CM

## 2019-09-17 DIAGNOSIS — M25.512 CHRONIC PAIN OF BOTH SHOULDERS: ICD-10-CM

## 2019-09-17 DIAGNOSIS — R20.2 NUMBNESS AND TINGLING OF BOTH UPPER EXTREMITIES: Primary | ICD-10-CM

## 2019-09-17 PROCEDURE — 97140 MANUAL THERAPY 1/> REGIONS: CPT | Mod: GP | Performed by: PHYSICAL THERAPIST

## 2019-09-17 PROCEDURE — 97110 THERAPEUTIC EXERCISES: CPT | Mod: GP | Performed by: PHYSICAL THERAPIST

## 2019-09-17 PROCEDURE — 97112 NEUROMUSCULAR REEDUCATION: CPT | Mod: GP | Performed by: PHYSICAL THERAPIST

## 2019-10-01 ENCOUNTER — THERAPY VISIT (OUTPATIENT)
Dept: PHYSICAL THERAPY | Facility: CLINIC | Age: 64
End: 2019-10-01
Payer: COMMERCIAL

## 2019-10-01 DIAGNOSIS — G89.29 CHRONIC PAIN OF BOTH SHOULDERS: ICD-10-CM

## 2019-10-01 DIAGNOSIS — M25.512 CHRONIC PAIN OF BOTH SHOULDERS: ICD-10-CM

## 2019-10-01 DIAGNOSIS — R20.0 NUMBNESS AND TINGLING OF BOTH UPPER EXTREMITIES: Primary | ICD-10-CM

## 2019-10-01 DIAGNOSIS — R20.2 NUMBNESS AND TINGLING OF BOTH UPPER EXTREMITIES: Primary | ICD-10-CM

## 2019-10-01 DIAGNOSIS — M25.511 CHRONIC PAIN OF BOTH SHOULDERS: ICD-10-CM

## 2019-10-01 PROCEDURE — 97140 MANUAL THERAPY 1/> REGIONS: CPT | Mod: GP | Performed by: PHYSICAL THERAPIST

## 2019-10-01 PROCEDURE — 97112 NEUROMUSCULAR REEDUCATION: CPT | Mod: GP | Performed by: PHYSICAL THERAPIST

## 2019-10-01 PROCEDURE — 97110 THERAPEUTIC EXERCISES: CPT | Mod: GP | Performed by: PHYSICAL THERAPIST

## 2019-10-11 ENCOUNTER — MYC REFILL (OUTPATIENT)
Dept: FAMILY MEDICINE | Facility: CLINIC | Age: 64
End: 2019-10-11

## 2019-10-11 DIAGNOSIS — G47.00 INSOMNIA, UNSPECIFIED TYPE: ICD-10-CM

## 2019-10-11 RX ORDER — ZOLPIDEM TARTRATE 10 MG/1
5 TABLET ORAL
Qty: 15 TABLET | Refills: 0 | Status: SHIPPED | OUTPATIENT
Start: 2019-10-11 | End: 2019-10-17

## 2019-10-11 NOTE — TELEPHONE ENCOUNTER
Zolpidem 10mg       Last Written Prescription Date:  8/8/19  Last Fill Quantity: 15,   # refills: 0  Last Office Visit: 9/10/19  Future Office visit:    Next 5 appointments (look out 90 days)    Nov 22, 2019  8:30 AM CST  PHYSICAL with Saqib Guajardo MD  [facility] [address] [facility] [address]  [phone]           Routing refill request to provider for review/approval because:  Drug not on the [facility], [facility] or [facility] refill protocol or controlled substance    Bianca EPSTEIN RN

## 2019-10-22 ENCOUNTER — THERAPY VISIT (OUTPATIENT)
Dept: PHYSICAL THERAPY | Facility: CLINIC | Age: 64
End: 2019-10-22
Payer: COMMERCIAL

## 2019-10-22 DIAGNOSIS — R20.2 NUMBNESS AND TINGLING OF BOTH UPPER EXTREMITIES: Primary | ICD-10-CM

## 2019-10-22 DIAGNOSIS — G89.29 CHRONIC PAIN OF BOTH SHOULDERS: ICD-10-CM

## 2019-10-22 DIAGNOSIS — M25.512 CHRONIC PAIN OF BOTH SHOULDERS: ICD-10-CM

## 2019-10-22 DIAGNOSIS — M25.511 CHRONIC PAIN OF BOTH SHOULDERS: ICD-10-CM

## 2019-10-22 DIAGNOSIS — R20.0 NUMBNESS AND TINGLING OF BOTH UPPER EXTREMITIES: Primary | ICD-10-CM

## 2019-10-22 PROCEDURE — 97112 NEUROMUSCULAR REEDUCATION: CPT | Mod: GP | Performed by: PHYSICAL THERAPIST

## 2019-10-22 PROCEDURE — 97110 THERAPEUTIC EXERCISES: CPT | Mod: GP | Performed by: PHYSICAL THERAPIST

## 2019-10-22 PROCEDURE — 97140 MANUAL THERAPY 1/> REGIONS: CPT | Mod: GP | Performed by: PHYSICAL THERAPIST

## 2019-10-31 ENCOUNTER — HEALTH MAINTENANCE LETTER (OUTPATIENT)
Age: 64
End: 2019-10-31
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
## 2019-11-22 ENCOUNTER — OFFICE VISIT (OUTPATIENT)
Dept: FAMILY MEDICINE | Facility: CLINIC | Age: 64
End: 2019-11-22
Payer: COMMERCIAL

## 2019-11-22 ENCOUNTER — THERAPY VISIT (OUTPATIENT)
Dept: PHYSICAL THERAPY | Facility: CLINIC | Age: 64
End: 2019-11-22
Payer: COMMERCIAL

## 2019-11-22 VITALS
SYSTOLIC BLOOD PRESSURE: 143 MMHG | HEART RATE: 62 BPM | DIASTOLIC BLOOD PRESSURE: 89 MMHG | HEIGHT: 61 IN | WEIGHT: 121 LBS | OXYGEN SATURATION: 98 % | BODY MASS INDEX: 22.84 KG/M2 | TEMPERATURE: 96.9 F

## 2019-11-22 DIAGNOSIS — Z78.9 VEGETARIAN: ICD-10-CM

## 2019-11-22 DIAGNOSIS — R20.2 NUMBNESS AND TINGLING OF BOTH UPPER EXTREMITIES: ICD-10-CM

## 2019-11-22 DIAGNOSIS — K58.1 IRRITABLE BOWEL SYNDROME WITH CONSTIPATION: ICD-10-CM

## 2019-11-22 DIAGNOSIS — F41.9 ANXIETY: ICD-10-CM

## 2019-11-22 DIAGNOSIS — G89.29 CHRONIC PAIN OF BOTH SHOULDERS: ICD-10-CM

## 2019-11-22 DIAGNOSIS — M25.512 CHRONIC PAIN OF BOTH SHOULDERS: ICD-10-CM

## 2019-11-22 DIAGNOSIS — R20.2 NUMBNESS AND TINGLING OF BOTH UPPER EXTREMITIES: Primary | ICD-10-CM

## 2019-11-22 DIAGNOSIS — J45.20 MILD INTERMITTENT ASTHMA WITHOUT COMPLICATION: ICD-10-CM

## 2019-11-22 DIAGNOSIS — Z00.00 ROUTINE GENERAL MEDICAL EXAMINATION AT A HEALTH CARE FACILITY: Primary | ICD-10-CM

## 2019-11-22 DIAGNOSIS — R03.0 ELEVATED BLOOD PRESSURE READING WITHOUT DIAGNOSIS OF HYPERTENSION: ICD-10-CM

## 2019-11-22 DIAGNOSIS — R20.0 NUMBNESS AND TINGLING OF BOTH UPPER EXTREMITIES: Primary | ICD-10-CM

## 2019-11-22 DIAGNOSIS — L40.9 PSORIASIS: ICD-10-CM

## 2019-11-22 DIAGNOSIS — E03.9 ACQUIRED HYPOTHYROIDISM: ICD-10-CM

## 2019-11-22 DIAGNOSIS — E78.5 HYPERLIPIDEMIA LDL GOAL <100: ICD-10-CM

## 2019-11-22 DIAGNOSIS — R20.0 NUMBNESS AND TINGLING OF BOTH UPPER EXTREMITIES: ICD-10-CM

## 2019-11-22 DIAGNOSIS — M25.511 CHRONIC PAIN OF BOTH SHOULDERS: ICD-10-CM

## 2019-11-22 LAB
ANION GAP SERPL CALCULATED.3IONS-SCNC: 6 MMOL/L (ref 3–14)
BUN SERPL-MCNC: 16 MG/DL (ref 7–30)
CALCIUM SERPL-MCNC: 9.2 MG/DL (ref 8.5–10.1)
CHLORIDE SERPL-SCNC: 108 MMOL/L (ref 94–109)
CHOLEST SERPL-MCNC: 247 MG/DL
CO2 SERPL-SCNC: 27 MMOL/L (ref 20–32)
CREAT SERPL-MCNC: 0.85 MG/DL (ref 0.52–1.04)
ERYTHROCYTE [DISTWIDTH] IN BLOOD BY AUTOMATED COUNT: 13.3 % (ref 10–15)
GFR SERPL CREATININE-BSD FRML MDRD: 73 ML/MIN/{1.73_M2}
GLUCOSE SERPL-MCNC: 85 MG/DL (ref 70–99)
HCT VFR BLD AUTO: 42.3 % (ref 35–47)
HDLC SERPL-MCNC: 74 MG/DL
HGB BLD-MCNC: 14.2 G/DL (ref 11.7–15.7)
HIV 1+2 AB+HIV1 P24 AG SERPL QL IA: NONREACTIVE
LDLC SERPL CALC-MCNC: 148 MG/DL
MCH RBC QN AUTO: 30.9 PG (ref 26.5–33)
MCHC RBC AUTO-ENTMCNC: 33.6 G/DL (ref 31.5–36.5)
MCV RBC AUTO: 92 FL (ref 78–100)
NONHDLC SERPL-MCNC: 173 MG/DL
PLATELET # BLD AUTO: 288 10E9/L (ref 150–450)
POTASSIUM SERPL-SCNC: 3.9 MMOL/L (ref 3.4–5.3)
RBC # BLD AUTO: 4.59 10E12/L (ref 3.8–5.2)
SODIUM SERPL-SCNC: 141 MMOL/L (ref 133–144)
TRIGL SERPL-MCNC: 125 MG/DL
TSH SERPL DL<=0.005 MIU/L-ACNC: 2.33 MU/L (ref 0.4–4)
VIT B12 SERPL-MCNC: 428 PG/ML (ref 193–986)
WBC # BLD AUTO: 5.6 10E9/L (ref 4–11)

## 2019-11-22 PROCEDURE — 87389 HIV-1 AG W/HIV-1&-2 AB AG IA: CPT | Performed by: INTERNAL MEDICINE

## 2019-11-22 PROCEDURE — G0145 SCR C/V CYTO,THINLAYER,RESCR: HCPCS | Performed by: INTERNAL MEDICINE

## 2019-11-22 PROCEDURE — 84443 ASSAY THYROID STIM HORMONE: CPT | Performed by: INTERNAL MEDICINE

## 2019-11-22 PROCEDURE — 36415 COLL VENOUS BLD VENIPUNCTURE: CPT | Performed by: INTERNAL MEDICINE

## 2019-11-22 PROCEDURE — 80048 BASIC METABOLIC PNL TOTAL CA: CPT | Performed by: INTERNAL MEDICINE

## 2019-11-22 PROCEDURE — 97112 NEUROMUSCULAR REEDUCATION: CPT | Mod: GP | Performed by: PHYSICAL THERAPIST

## 2019-11-22 PROCEDURE — 82607 VITAMIN B-12: CPT | Performed by: INTERNAL MEDICINE

## 2019-11-22 PROCEDURE — 99396 PREV VISIT EST AGE 40-64: CPT | Performed by: INTERNAL MEDICINE

## 2019-11-22 PROCEDURE — 85027 COMPLETE CBC AUTOMATED: CPT | Performed by: INTERNAL MEDICINE

## 2019-11-22 PROCEDURE — 87624 HPV HI-RISK TYP POOLED RSLT: CPT | Performed by: INTERNAL MEDICINE

## 2019-11-22 PROCEDURE — 97140 MANUAL THERAPY 1/> REGIONS: CPT | Mod: GP | Performed by: PHYSICAL THERAPIST

## 2019-11-22 PROCEDURE — 80061 LIPID PANEL: CPT | Performed by: INTERNAL MEDICINE

## 2019-11-22 RX ORDER — ALBUTEROL SULFATE 90 UG/1
2 AEROSOL, METERED RESPIRATORY (INHALATION) EVERY 6 HOURS PRN
Qty: 1 INHALER | Refills: 0 | Status: SHIPPED | OUTPATIENT
Start: 2019-11-22

## 2019-11-22 RX ORDER — LEVOTHYROXINE SODIUM 50 UG/1
50 TABLET ORAL DAILY
Qty: 90 TABLET | Refills: 3 | Status: SHIPPED | OUTPATIENT
Start: 2019-11-22 | End: 2020-12-11

## 2019-11-22 RX ORDER — CITALOPRAM HYDROBROMIDE 20 MG/1
20 TABLET ORAL DAILY
Qty: 90 TABLET | Refills: 3 | Status: SHIPPED | OUTPATIENT
Start: 2019-11-22 | End: 2020-12-11

## 2019-11-22 ASSESSMENT — MIFFLIN-ST. JEOR: SCORE: 1044.73

## 2019-11-23 ASSESSMENT — ASTHMA QUESTIONNAIRES: ACT_TOTALSCORE: 22

## 2019-11-27 LAB
COPATH REPORT: NORMAL
PAP: NORMAL

## 2019-11-29 LAB
FINAL DIAGNOSIS: NORMAL
HPV HR 12 DNA CVX QL NAA+PROBE: NEGATIVE
HPV16 DNA SPEC QL NAA+PROBE: NEGATIVE
HPV18 DNA SPEC QL NAA+PROBE: NEGATIVE
SPECIMEN DESCRIPTION: NORMAL
SPECIMEN SOURCE CVX/VAG CYTO: NORMAL

## 2019-12-17 ENCOUNTER — THERAPY VISIT (OUTPATIENT)
Dept: PHYSICAL THERAPY | Facility: CLINIC | Age: 64
End: 2019-12-17
Payer: COMMERCIAL

## 2019-12-17 ENCOUNTER — MYC REFILL (OUTPATIENT)
Dept: FAMILY MEDICINE | Facility: CLINIC | Age: 64
End: 2019-12-17

## 2019-12-17 DIAGNOSIS — R20.0 NUMBNESS AND TINGLING OF BOTH UPPER EXTREMITIES: Primary | ICD-10-CM

## 2019-12-17 DIAGNOSIS — M25.511 CHRONIC PAIN OF BOTH SHOULDERS: ICD-10-CM

## 2019-12-17 DIAGNOSIS — M25.512 CHRONIC PAIN OF BOTH SHOULDERS: ICD-10-CM

## 2019-12-17 DIAGNOSIS — G89.29 CHRONIC PAIN OF BOTH SHOULDERS: ICD-10-CM

## 2019-12-17 DIAGNOSIS — G47.00 INSOMNIA, UNSPECIFIED TYPE: ICD-10-CM

## 2019-12-17 DIAGNOSIS — R20.2 NUMBNESS AND TINGLING OF BOTH UPPER EXTREMITIES: Primary | ICD-10-CM

## 2019-12-17 PROCEDURE — 97140 MANUAL THERAPY 1/> REGIONS: CPT | Mod: GP | Performed by: PHYSICAL THERAPIST

## 2019-12-17 PROCEDURE — 97112 NEUROMUSCULAR REEDUCATION: CPT | Mod: GP | Performed by: PHYSICAL THERAPIST

## 2019-12-17 NOTE — PROGRESS NOTES
PROGRESS  REPORT    Progress reporting period is from 8/7/19 to 12/17/19.       SUBJECTIVE   Subjective: Reports she feels increase in shoulder pain and upper extremity numbness and tingling over the past month.  Notes that she feels that her yoga has decreased in her abilities over the past month also.  Notes that she has continued with her exercises but but maybe not as consistently as previously.  May consider cervical spine evaluation in the New Year.    Current pain level is 2/10   Previous pain level was 5/10  Changes in function: Pt was [place] and was able to manage symptoms with HEP. This past month increase in UE and neck pain with difficulty managing symptoms with HEP  Adverse reaction to treatment or activity: None    OBJECTIVE  Changes noted in objective findings:  The objective findings below are from DOS 12/17/19.  Objective: Moderate muscular tightness of posterior lateral cervical spine muscles bilaterally left greater than right.  Forward head posture, forward rounded shoulders, posterior pelvic tilt increased today compared to last month.  Moderate tightness of bilateral lateral hip rotators left greater than right.  Left ilium slight posterior rotation.  Elevated first rib bilaterally left greater than right.  Cervical rotation to the right 50 degrees left 45 degrees.     ASSESSMENT/PLAN  Updated problem list and treatment plan: Diagnosis 1:  Bilateral shoulder pain  Pain -  hot/cold therapy, manual therapy, self management, education and home program  Decreased joint mobility - manual therapy, therapeutic exercise, therapeutic activity and home program  Impaired posture - neuro re-education, therapeutic activities and home program  Diagnosis 2:  Cervical spine pain   Pain -  hot/cold therapy, manual therapy, self management, education and home program  Decreased joint mobility - manual therapy, therapeutic exercise, therapeutic activity and home program  Impaired posture - neuro  re-education, therapeutic activities and home program  STG/LTGs have been met or progress has been made towards goals:  Yes, progressing towards goals of treatment  Assessment of Progress: The patient's progress has plateaued.  The patient's progress has slowed.  Self Management Plans:  Patient has been instructed in a home treatment program.  Patient  has been instructed in self management of symptoms.  I have re-evaluated this patient and find that the nature, scope, duration and intensity of the therapy is appropriate for the medical condition of the patient.  Salma continues to require the following intervention to meet STG and LTG's:  PT    Recommendations:  This patient would benefit from continued therapy.     Frequency:  2 X a month, once daily  Duration:  for 3 months      This patient would benefit from further evaluation.Patient has been instructed to return to her MD for further evaluation.   Please refer to the daily flowsheet for treatment today, total treatment time and time spent performing 1:1 timed codes.

## 2019-12-19 PROBLEM — J45.909 ASTHMA: Status: ACTIVE | Noted: 2019-12-19

## 2019-12-19 PROBLEM — R94.6 NONSPECIFIC ABNORMAL RESULTS OF THYROID FUNCTION STUDY: Status: ACTIVE | Noted: 2019-12-19

## 2019-12-19 PROBLEM — K59.00 CONSTIPATION: Status: ACTIVE | Noted: 2019-12-19

## 2019-12-19 PROBLEM — Z00.00 ROUTINE GENERAL MEDICAL EXAMINATION AT A HEALTH CARE FACILITY: Status: ACTIVE | Noted: 2019-12-19

## 2019-12-19 PROBLEM — M79.18 MUSCULOSKELETAL PAIN: Status: ACTIVE | Noted: 2019-12-19

## 2019-12-19 RX ORDER — ZOLPIDEM TARTRATE 10 MG/1
5 TABLET ORAL
Start: 2019-12-19

## 2019-12-19 NOTE — TELEPHONE ENCOUNTER
Controlled Substance Refill Request for Pending Prescriptions:                       Disp   Refills    zolpidem (AMBIEN) 10 MG tablet            15 tab*5            Sig: Take 0.5 tablets (5 mg) by mouth nightly as           needed for sleep        Duplicate;confirmed with pharmacy that patient has available refills on file.  Yaakov Marrufo CMA on 12/19/2019 at 12:39 PM

## 2019-12-20 NOTE — TELEPHONE ENCOUNTER
Denied RX with note to prepare fill.     MyC message sent to patient to please call pharmacy, as well to request a preparation.     Carmina HOOKER RN

## 2020-01-21 ENCOUNTER — MYC REFILL (OUTPATIENT)
Dept: FAMILY MEDICINE | Facility: CLINIC | Age: 65
End: 2020-01-21

## 2020-01-21 DIAGNOSIS — G47.00 INSOMNIA, UNSPECIFIED TYPE: ICD-10-CM

## 2020-01-21 RX ORDER — ZOLPIDEM TARTRATE 10 MG/1
5 TABLET ORAL
Qty: 15 TABLET | Refills: 5 | Status: CANCELLED | OUTPATIENT
Start: 2020-01-21

## 2020-01-21 NOTE — TELEPHONE ENCOUNTER
zolpidem (AMBIEN) 10 MG tablet 15 tablet 5 10/17/2019           Last Written Prescription Date:  10/17/2019  Last Fill Quantity: 15,   # refills: 5  Last Office Visit: 11/22/2019  Future Office visit: Unknown      Routing refill request to provider for review/approval because:  Drug not on the [facility], [facility] or [facility] refill protocol or controlled substance

## 2020-01-23 NOTE — TELEPHONE ENCOUNTER
Rx was sent to [facility] 10/17 for 6 month supply    Rx refused. Duplicate/early request. Pharmacy notified.    Bianca EPSTEIN RN

## 2020-01-25 ENCOUNTER — TELEPHONE (OUTPATIENT)
Dept: SCHEDULING | Facility: CLINIC | Age: 65
End: 2020-01-25

## 2020-01-25 NOTE — TELEPHONE ENCOUNTER
1/25/2020    Call Regarding Preventive Health Screening Mammogram       Attempt 1    Message on voicemail    Comments:       Outreach   WALI

## 2020-01-30 NOTE — TELEPHONE ENCOUNTER
1/30/2020    Call Regarding Preventive Health Screening Mammogram       Attempt 2    Message on voicemail    Comments:       Outreach   GB

## 2020-02-07 NOTE — TELEPHONE ENCOUNTER
2/7/2020    Call Regarding Preventive Health Screening Mammogram       Attempt 3    Message on voicemail      Outreach   ANDREWS

## 2020-04-10 ENCOUNTER — TELEPHONE (OUTPATIENT)
Dept: PHYSICAL THERAPY | Facility: CLINIC | Age: 65
End: 2020-04-10

## 2020-04-10 DIAGNOSIS — M25.512 CHRONIC PAIN OF BOTH SHOULDERS: ICD-10-CM

## 2020-04-10 DIAGNOSIS — M25.511 CHRONIC PAIN OF BOTH SHOULDERS: ICD-10-CM

## 2020-04-10 DIAGNOSIS — G89.29 CHRONIC PAIN OF BOTH SHOULDERS: ICD-10-CM

## 2020-04-10 NOTE — TELEPHONE ENCOUNTER
Spoke with patient.  She does not need PT services at this time.  Pt will call if needs continued PT services in the future.  Discharged PT.  Please seen past previous soap or progress note from last visit on 12/17/2019.

## 2020-04-20 ENCOUNTER — MYC REFILL (OUTPATIENT)
Dept: FAMILY MEDICINE | Facility: CLINIC | Age: 65
End: 2020-04-20

## 2020-04-20 DIAGNOSIS — G47.00 INSOMNIA, UNSPECIFIED TYPE: ICD-10-CM

## 2020-04-20 RX ORDER — ZOLPIDEM TARTRATE 10 MG/1
5 TABLET ORAL
Qty: 15 TABLET | Refills: 5 | Status: CANCELLED | OUTPATIENT
Start: 2020-04-20

## 2020-04-20 NOTE — TELEPHONE ENCOUNTER
zolpidem (AMBIEN) 10 MG tablet  15 tablet  5  10/17/2019          Last Written Prescription Date:  10/17/2019  Last Fill Quantity: 15,  # refills: 5   Last office visit: 11/22/2019 with prescribing provider:  Casandra Roman Office Visit:  Unknown

## 2020-04-21 RX ORDER — ZOLPIDEM TARTRATE 10 MG/1
5 TABLET ORAL
Qty: 15 TABLET | Refills: 5 | Status: SHIPPED | OUTPATIENT
Start: 2020-04-21 | End: 2020-10-20

## 2020-04-21 NOTE — TELEPHONE ENCOUNTER
zolpidem (AMBIEN) 10 MG tablet      Last Written Prescription Date:  4/21/2020  Last Fill Quantity: 15,   # refills: 5  Last Office Visit: Casandra Roman Office visit:       Routing refill request to provider for review/approval because:  Drug not on the [facility], [facility] or [facility] refill protocol or controlled substance

## 2020-04-21 NOTE — TELEPHONE ENCOUNTER
Routing refill request to provider for review/approval because:  Drug not on the [facility] refill protocol   Hannah Hoang RN

## 2020-06-30 ENCOUNTER — THERAPY VISIT (OUTPATIENT)
Dept: PHYSICAL THERAPY | Facility: CLINIC | Age: 65
End: 2020-06-30
Payer: COMMERCIAL

## 2020-06-30 DIAGNOSIS — M25.512 ACUTE PAIN OF LEFT SHOULDER: ICD-10-CM

## 2020-06-30 PROCEDURE — 97161 PT EVAL LOW COMPLEX 20 MIN: CPT | Mod: GP

## 2020-06-30 PROCEDURE — 97112 NEUROMUSCULAR REEDUCATION: CPT | Mod: GP

## 2020-06-30 PROCEDURE — 97110 THERAPEUTIC EXERCISES: CPT | Mod: GP

## 2020-07-17 ENCOUNTER — THERAPY VISIT (OUTPATIENT)
Dept: PHYSICAL THERAPY | Facility: CLINIC | Age: 65
End: 2020-07-17
Payer: COMMERCIAL

## 2020-07-17 DIAGNOSIS — R20.0 NUMBNESS AND TINGLING OF BOTH UPPER EXTREMITIES: ICD-10-CM

## 2020-07-17 DIAGNOSIS — M25.512 ACUTE PAIN OF LEFT SHOULDER: ICD-10-CM

## 2020-07-17 DIAGNOSIS — R20.2 NUMBNESS AND TINGLING OF BOTH UPPER EXTREMITIES: ICD-10-CM

## 2020-07-17 PROCEDURE — 97112 NEUROMUSCULAR REEDUCATION: CPT | Mod: GP

## 2020-07-17 PROCEDURE — 97110 THERAPEUTIC EXERCISES: CPT | Mod: GP

## 2020-07-30 ENCOUNTER — E-VISIT (OUTPATIENT)
Dept: FAMILY MEDICINE | Facility: CLINIC | Age: 65
End: 2020-07-30
Payer: COMMERCIAL

## 2020-07-30 DIAGNOSIS — Z71.84 TRAVEL ADVICE ENCOUNTER: Primary | ICD-10-CM

## 2020-07-30 PROCEDURE — 99421 OL DIG E/M SVC 5-10 MIN: CPT | Performed by: INTERNAL MEDICINE

## 2020-09-14 PROBLEM — R20.2 NUMBNESS AND TINGLING OF BOTH UPPER EXTREMITIES: Status: RESOLVED | Noted: 2019-08-13 | Resolved: 2020-09-14

## 2020-09-14 PROBLEM — M25.512 ACUTE PAIN OF LEFT SHOULDER: Status: RESOLVED | Noted: 2020-06-30 | Resolved: 2020-09-14

## 2020-09-14 PROBLEM — R20.0 NUMBNESS AND TINGLING OF BOTH UPPER EXTREMITIES: Status: RESOLVED | Noted: 2019-08-13 | Resolved: 2020-09-14

## 2020-09-14 NOTE — PROGRESS NOTES
Discharge Note    Progress reporting period is from initial evaluation date (please see noted date below) to Jul 17, 2020.  Linked Episodes   Type: Episode: Status: Noted: Resolved: Last update: Updated by:   PHYSICAL THERAPY L sh/arm Active 6/30/2020 7/17/2020  1:41 PM Terell Minaya, PT      Comments:       Salma failed to follow up and current status is unknown.  Please see information below for last relevant information on current status.  Patient seen for 2 visits.    SUBJECTIVE  Subjective changes noted by patient:  about the same- tigheness thru triceps and UT  .  Current pain level is  .     Previous pain level was   .   Changes in function:  Yes (See Goal flowsheet attached for changes in current functional level)  Adverse reaction to treatment or activity: None    OBJECTIVE  Changes noted in objective findings: Flex=170; ER @ 90 =  95; rounded shoulder likely contributing to sx     ASSESSMENT/PLAN  Diagnosis: L sh pain - reduced GH and C/S ROM, RTC muscle imbalance    Updated problem list and treatment plan:   Pain - HEP  STG/LTGs have been met or progress has been made towards goals:  Yes, please see goal flowsheet for most current information  Assessment of Progress: current status is unknown.    Last current status:     Self Management Plans:  HEP  I have re-evaluated this patient and find that the nature, scope, duration and intensity of the therapy is appropriate for the medical condition of the patient.  Salma continues to require the following intervention to meet STG and LTG's:  HEP.    Recommendations:  Discharge with current home program.  Patient to follow up with MD as needed.    Please refer to the daily flowsheet for treatment today, total treatment time and time spent performing 1:1 timed codes.

## 2020-10-20 ENCOUNTER — MYC REFILL (OUTPATIENT)
Dept: FAMILY MEDICINE | Facility: CLINIC | Age: 65
End: 2020-10-20

## 2020-10-20 DIAGNOSIS — G47.00 INSOMNIA, UNSPECIFIED TYPE: ICD-10-CM

## 2020-10-20 NOTE — TELEPHONE ENCOUNTER
Zolpidem  Last Written Prescription Date:  4/21/2020  Last Fill Quantity: 15,  # refills: 5   Last office visit: 11/22/2019 with prescribing provider:  Casandra   Future Office Visit:        Routing refill request to provider for review/approval because:  Drug not on the [facility] refill protocol     MADY Slaughter, RN  [facility]

## 2020-10-21 RX ORDER — ZOLPIDEM TARTRATE 10 MG/1
5 TABLET ORAL
Qty: 15 TABLET | Refills: 5 | Status: SHIPPED | OUTPATIENT
Start: 2020-10-21 | End: 2021-05-11

## 2020-11-07 ENCOUNTER — HEALTH MAINTENANCE LETTER (OUTPATIENT)
Age: 65
End: 2020-11-07

## 2020-12-10 DIAGNOSIS — E03.9 ACQUIRED HYPOTHYROIDISM: ICD-10-CM

## 2020-12-10 DIAGNOSIS — F41.9 ANXIETY: ICD-10-CM

## 2020-12-11 RX ORDER — CITALOPRAM HYDROBROMIDE 20 MG/1
20 TABLET ORAL DAILY
Qty: 30 TABLET | Refills: 0 | Status: SHIPPED | OUTPATIENT
Start: 2020-12-11 | End: 2021-01-15

## 2020-12-11 RX ORDER — LEVOTHYROXINE SODIUM 50 UG/1
50 TABLET ORAL DAILY
Qty: 30 TABLET | Refills: 0 | Status: SHIPPED | OUTPATIENT
Start: 2020-12-11 | End: 2021-01-15

## 2020-12-11 NOTE — TELEPHONE ENCOUNTER
"Medication filled 1 time as pt is due for a follow-up in clinic.    Bianca EPSTEIN RN      Requested Prescriptions   Pending Prescriptions Disp Refills     citalopram (CELEXA) 20 MG tablet 90 tablet 3     Sig: Take 1 tablet (20 mg) by mouth daily       SSRIs Protocol Passed - 12/10/2020 10:09 AM        Passed - Recent (12 mo) or future (30 days) visit within the authorizing provider's specialty     Patient has had an office visit with the authorizing provider or a provider within the authorizing providers department within the previous 12 mos or has a future within next 30 days. See \"Patient Info\" tab in inbasket, or \"Choose Columns\" in Meds & Orders section of the refill encounter.              Passed - Medication is active on med list        Passed - Patient is age 18 or older        Passed - No active pregnancy on record        Passed - No positive pregnancy test in last 12 months           levothyroxine (SYNTHROID/LEVOTHROID) 50 MCG tablet 90 tablet 3     Sig: Take 1 tablet (50 mcg) by mouth daily       Thyroid Protocol Failed - 12/10/2020 10:09 AM        Failed - Normal TSH on file in past 12 months     Recent Labs   Lab Test 11/22/19  0923   TSH 2.33              Passed - Patient is 12 years or older        Passed - Recent (12 mo) or future (30 days) visit within the authorizing provider's specialty     Patient has had an office visit with the authorizing provider or a provider within the authorizing providers department within the previous 12 mos or has a future within next 30 days. See \"Patient Info\" tab in inbasket, or \"Choose Columns\" in Meds & Orders section of the refill encounter.              Passed - Medication is active on med list        Passed - No active pregnancy on record     If patient is pregnant or has had a positive pregnancy test, please check TSH.          Passed - No positive pregnancy test in past 12 months     If patient is pregnant or has had a positive pregnancy test, please check TSH.   "

## 2020-12-16 ENCOUNTER — MYC REFILL (OUTPATIENT)
Dept: FAMILY MEDICINE | Facility: CLINIC | Age: 65
End: 2020-12-16

## 2020-12-16 DIAGNOSIS — G47.00 INSOMNIA, UNSPECIFIED TYPE: ICD-10-CM

## 2020-12-16 RX ORDER — ZOLPIDEM TARTRATE 10 MG/1
5 TABLET ORAL
Qty: 15 TABLET | Refills: 5 | Status: CANCELLED | OUTPATIENT
Start: 2020-12-16

## 2020-12-16 NOTE — TELEPHONE ENCOUNTER
RN called to current pharmacy ([id number] - [place], [place] - [address] AT [address] & [facility]).    Last time pt picked up zolpidem 11/16.  [employer] can travel x1.    RN requested transfer.   Pt alerted in other MC.

## 2021-01-14 DIAGNOSIS — E03.9 ACQUIRED HYPOTHYROIDISM: ICD-10-CM

## 2021-01-14 DIAGNOSIS — F41.9 ANXIETY: ICD-10-CM

## 2021-01-15 ENCOUNTER — HEALTH MAINTENANCE LETTER (OUTPATIENT)
Age: 66
End: 2021-01-15

## 2021-01-15 RX ORDER — LEVOTHYROXINE SODIUM 50 UG/1
50 TABLET ORAL DAILY
Qty: 30 TABLET | Refills: 0 | Status: SHIPPED | OUTPATIENT
Start: 2021-01-15 | End: 2021-03-03

## 2021-01-15 RX ORDER — CITALOPRAM HYDROBROMIDE 20 MG/1
20 TABLET ORAL DAILY
Qty: 30 TABLET | Refills: 0 | Status: SHIPPED | OUTPATIENT
Start: 2021-01-15 | End: 2021-03-03

## 2021-03-03 DIAGNOSIS — F41.9 ANXIETY: ICD-10-CM

## 2021-03-03 DIAGNOSIS — E03.9 ACQUIRED HYPOTHYROIDISM: ICD-10-CM

## 2021-03-04 RX ORDER — LEVOTHYROXINE SODIUM 50 UG/1
50 TABLET ORAL DAILY
Qty: 90 TABLET | Refills: 1 | Status: SHIPPED | OUTPATIENT
Start: 2021-03-04 | End: 2021-08-26

## 2021-03-04 RX ORDER — CITALOPRAM HYDROBROMIDE 20 MG/1
20 TABLET ORAL DAILY
Qty: 90 TABLET | Refills: 1 | Status: SHIPPED | OUTPATIENT
Start: 2021-03-04 | End: 2021-08-26

## 2021-03-04 NOTE — TELEPHONE ENCOUNTER
Routing refill request to provider for review/approval because:  Patient needs to be seen because it has been more than 1 year since last office visit. Pt has upcoming visit but not until July (>3 months)

## 2021-04-06 ENCOUNTER — TRANSFERRED RECORDS (OUTPATIENT)
Dept: HEALTH INFORMATION MANAGEMENT | Facility: CLINIC | Age: 66
End: 2021-04-06

## 2021-04-11 ENCOUNTER — E-VISIT (OUTPATIENT)
Dept: FAMILY MEDICINE | Facility: CLINIC | Age: 66
End: 2021-04-11
Payer: COMMERCIAL

## 2021-04-11 DIAGNOSIS — R05.9 COUGH: Primary | ICD-10-CM

## 2021-04-11 PROCEDURE — 99421 OL DIG E/M SVC 5-10 MIN: CPT | Performed by: INTERNAL MEDICINE

## 2021-04-12 RX ORDER — AMOXICILLIN 875 MG
875 TABLET ORAL 2 TIMES DAILY
Qty: 14 TABLET | Refills: 0 | Status: SHIPPED | OUTPATIENT
Start: 2021-04-12 | End: 2021-09-24

## 2021-04-12 NOTE — PATIENT INSTRUCTIONS
"    Dear Salma Lew    After reviewing your responses, I've been able to diagnose you with \"Bronchitis\" which is a common infection of your lungs. While this is most commonly caused by a virus, the symptoms you have given suggest you should be treated with antibiotics.     I have sent Amox to your pharmacy to treat this infection.     It is important that you take all of your prescribed medication even if your symptoms are improving after a few doses. Taking all of your medicine helps prevent the symptoms from returning.     If your symptoms worsen, you develop chest pain or shortness of breath, fevers over 101, or are not improving in 5 days, please contact your primary care provider for an appointment or visit any of our convenient Walk-in Care or Urgent Care Centers to be seen which can be found on our website here.    Thanks again for choosing us as your health care partner,    Saqib Guajardo MD    "

## 2021-05-10 DIAGNOSIS — G47.00 INSOMNIA, UNSPECIFIED TYPE: ICD-10-CM

## 2021-05-10 NOTE — TELEPHONE ENCOUNTER
Routing refill request to provider for review/approval because:  Drug not on the G refill protocol     Pending Prescriptions:                       Disp   Refills    zolpidem (AMBIEN) 10 MG tablet [Pharmacy *15 tab*             Sig: TAKE ONE-HALF TABLET BY MOUTH EVERY NIGHT AS           NEEDED FOR SLEEP    Last Written Prescription Date:  10/21/2020  Last Fill Quantity: 15,  # refills: 5   Last office visit: 11/22/2019 with prescribing provider:  Saqib Guajardo    Future Office Visit:   Next 5 appointments (look out 90 days)    Jul 02, 2021  9:30 AM  PHYSICAL with Saqib Guajardo MD  [facility] ([facility] ) [address] [facility] [id number] [phone]           Kalia Collado RN  [facility]

## 2021-05-11 RX ORDER — ZOLPIDEM TARTRATE 10 MG/1
TABLET ORAL
Qty: 15 TABLET | Refills: 1 | Status: SHIPPED | OUTPATIENT
Start: 2021-05-11 | End: 2021-07-29

## 2021-07-27 ENCOUNTER — MYC MEDICAL ADVICE (OUTPATIENT)
Dept: FAMILY MEDICINE | Facility: CLINIC | Age: 66
End: 2021-07-27

## 2021-07-27 DIAGNOSIS — G47.00 INSOMNIA, UNSPECIFIED TYPE: ICD-10-CM

## 2021-07-29 RX ORDER — ZOLPIDEM TARTRATE 10 MG/1
TABLET ORAL
Qty: 15 TABLET | Refills: 3 | Status: SHIPPED | OUTPATIENT
Start: 2021-07-29 | End: 2021-12-08

## 2021-07-29 RX ORDER — ZOLPIDEM TARTRATE 10 MG/1
TABLET ORAL
Qty: 15 TABLET | Refills: 1 | OUTPATIENT
Start: 2021-07-29

## 2021-07-29 NOTE — TELEPHONE ENCOUNTER
Duplicate encounter please see previous med request encounter on 7/27/2021.    Kalia Collado RN  [facility]

## 2021-07-29 NOTE — TELEPHONE ENCOUNTER
Routing refill request to provider for review/approval because:  Drug not on the G refill protocol     Pending Prescriptions:                       Disp   Refills    zolpidem (AMBIEN) 10 MG tablet [Pharmacy *15 tab*             Sig: TAKE 1/2 TABLET BY MOUTH EVERY NIGHT AS NEEDED           FOR SLEEP    Last Written Prescription Date:  5/11/2021  Last Fill Quantity: 15,  # refills: 1   Last office visit: 11/22/2019 with prescribing provider:  Saqib Guajardo    Future Office Visit:  11/22/2021    Kalia Collado RN  [facility] Triage Nurse

## 2021-08-24 DIAGNOSIS — F41.9 ANXIETY: ICD-10-CM

## 2021-08-24 DIAGNOSIS — E03.9 ACQUIRED HYPOTHYROIDISM: ICD-10-CM

## 2021-08-26 RX ORDER — LEVOTHYROXINE SODIUM 50 UG/1
TABLET ORAL
Qty: 90 TABLET | Refills: 1 | Status: SHIPPED | OUTPATIENT
Start: 2021-08-26 | End: 2022-02-21

## 2021-08-26 RX ORDER — CITALOPRAM HYDROBROMIDE 20 MG/1
TABLET ORAL
Qty: 90 TABLET | Refills: 1 | Status: SHIPPED | OUTPATIENT
Start: 2021-08-26 | End: 2022-02-21

## 2021-08-26 NOTE — TELEPHONE ENCOUNTER
Prescription approved per [facility] Refill Protocol.  Alana Chen RN  [facility]

## 2021-08-26 NOTE — TELEPHONE ENCOUNTER
Routing refill request to provider for review/approval because:  Labs out of date:    TSH   Date Value Ref Range Status   11/22/2019 2.33 0.40 - 4.00 mU/L Final        Alana Chen RN  [facility] Triage

## 2021-09-05 ENCOUNTER — HEALTH MAINTENANCE LETTER (OUTPATIENT)
Age: 66
End: 2021-09-05

## 2021-09-24 ENCOUNTER — VIRTUAL VISIT (OUTPATIENT)
Dept: FAMILY MEDICINE | Facility: CLINIC | Age: 66
End: 2021-09-24
Payer: COMMERCIAL

## 2021-09-24 DIAGNOSIS — R03.0 ELEVATED BLOOD PRESSURE READING WITHOUT DIAGNOSIS OF HYPERTENSION: ICD-10-CM

## 2021-09-24 DIAGNOSIS — E03.9 ACQUIRED HYPOTHYROIDISM: ICD-10-CM

## 2021-09-24 DIAGNOSIS — E78.5 HYPERLIPIDEMIA LDL GOAL <100: ICD-10-CM

## 2021-09-24 DIAGNOSIS — J45.20 MILD INTERMITTENT ASTHMA WITHOUT COMPLICATION: ICD-10-CM

## 2021-09-24 DIAGNOSIS — G47.00 INSOMNIA, UNSPECIFIED TYPE: Primary | ICD-10-CM

## 2021-09-24 DIAGNOSIS — Z12.11 SPECIAL SCREENING FOR MALIGNANT NEOPLASMS, COLON: ICD-10-CM

## 2021-09-24 PROCEDURE — 99214 OFFICE O/P EST MOD 30 MIN: CPT | Mod: 95 | Performed by: INTERNAL MEDICINE

## 2021-09-24 RX ORDER — DOXEPIN HYDROCHLORIDE 10 MG/1
10-20 CAPSULE ORAL AT BEDTIME
Qty: 90 CAPSULE | Refills: 1 | Status: SHIPPED | OUTPATIENT
Start: 2021-09-24

## 2021-09-24 NOTE — PROGRESS NOTES
Salma is a 66 year old who is being evaluated via a billable telephone visit.      What phone number would you like to be contacted at? [phone]  How would you like to obtain your AVS? MyChart    Assessment & Plan     Insomnia, unspecified type  We discussed the Ambien side effect and also sleepwalking and accidents  We will try Sinequan  - doxepin (SINEQUAN) 10 MG capsule  Dispense: 90 capsule; Refill: 1    Elevated blood pressure reading without diagnosis of hypertension  Blood pressure will be checked again  - CBC with Platelets      Special screening for malignant neoplasms, colon  We will do Cologuard  - Fecal colorectal cancer screen (FIT)  - COLOGUARD(EXACT SCIENCES)    Acquired hypothyroidism  Due for labs  - CBC with Platelets    - TSH with free T4 reflex    Hyperlipidemia LDL goal <100  Due for labs  - Comprehensive metabolic panel  - Lipid panel reflex to direct LDL Fasting    Mild intermittent asthma without complication  Asthma is quite stable          Return in about 6 months (around 3/24/2022) for Physical Exam, Lab Work.    Saqib Guajardo MD  [facility]    Subjective   Salma is a 66 year old who presents for the following health issues     HPI     Chief Complaint   Patient presents with     Recheck Medication     zolpidem (AMBIEN) 10 MG tablet     This very pleasant 66 years old has been on Ambien for insomnia  Other medications has failed in the past  She is concerned about safety she has not checked her blood pressure at home    Very active and does not have any new symptoms          Review of Systems   .    10 point review negative on ROS            Vitals:  No vitals were obtained today due to virtual visit.    Physical Exam   healthy, alert and no distress  PSYCH: Alert and oriented times 3; coherent speech, normal   rate and volume, able to articulate logical thoughts, able   to abstract reason, no tangential thoughts, no hallucinations   or delusions  Her affect is  normal  RESP: No cough, no audible wheezing, able to talk in full sentences  Remainder of exam unable to be completed due to telephone visits    Patient Active Problem List   Diagnosis     Hyperlipidemia     Anxiety state     Mild intermittent asthma     Psoriasis     Acquired hypothyroidism     Hyperlipidemia LDL goal <100     Primary insomnia     Routine general medical examination at a health care facility     Nonspecific abnormal results of thyroid function study     Constipation     Asthma     Iron deficiency anemia, unspecified     Musculoskeletal pain             Phone call duration: 10* minutes

## 2021-09-25 ASSESSMENT — ASTHMA QUESTIONNAIRES: ACT_TOTALSCORE: 23

## 2021-09-27 ENCOUNTER — MYC MEDICAL ADVICE (OUTPATIENT)
Dept: FAMILY MEDICINE | Facility: CLINIC | Age: 66
End: 2021-09-27

## 2021-09-27 DIAGNOSIS — G47.00 INSOMNIA, UNSPECIFIED TYPE: ICD-10-CM

## 2021-09-28 ENCOUNTER — MYC MEDICAL ADVICE (OUTPATIENT)
Dept: FAMILY MEDICINE | Facility: CLINIC | Age: 66
End: 2021-09-28

## 2021-09-28 RX ORDER — DOXEPIN 3 MG/1
3-6 TABLET, FILM COATED ORAL
Qty: 60 TABLET | Refills: 1 | Status: SHIPPED | OUTPATIENT
Start: 2021-09-28

## 2021-09-28 NOTE — TELEPHONE ENCOUNTER
See encounter from 9/28/2021,   Provider sent lower dose script:   Disp Refills Start End VARUN   doxepin (SILENOR) 3 MG tablet 60 tablet 1 9/28/2021  --   Sig - Route: Take 1-2 tablets (3-6 mg) by mouth nightly as needed for sleep - Oral   Sent to pharmacy as: Doxepin HCl 3 MG Oral Tablet (SILENOR)   Class: E-Prescribe   Order: [id number]   E-Prescribing Status: Receipt confirmed by pharmacy (9/28/2021 11:28 AM CDT)       Printout Tracking    External Result Report     Pharmacy    [id number] - [place], [place] - [address] AT [facility] OF [address] Collado RN  [facility]

## 2021-09-28 NOTE — TELEPHONE ENCOUNTER
Please see patient's mychart.    Cannot order smaller dosage in epic.    Please reply back to patient, route to triage follow up, or route to team coordinators to have patient schedule an appointment.     Kalia Collado RN  [facility]

## 2021-10-04 NOTE — NURSING NOTE
"Chief Complaint   Patient presents with     Establish Care       Initial /73 (BP Location: Left arm, Patient Position: Chair, Cuff Size: Adult Regular)  Pulse 65  Temp 98  F (36.7  C) (Oral)  Resp 16  Ht 5' 1.5\" (1.562 m)  Wt 128 lb (58.1 kg)  SpO2 99%  BMI 23.79 kg/m2 Estimated body mass index is 23.79 kg/(m^2) as calculated from the following:    Height as of this encounter: 5' 1.5\" (1.562 m).    Weight as of this encounter: 128 lb (58.1 kg).  Medication Reconciliation: complete   Micki Hernandez CMA (AAMA)      "
Yes-Patient/Caregiver accepts free interpretation services...

## 2021-12-07 DIAGNOSIS — G47.00 INSOMNIA, UNSPECIFIED TYPE: ICD-10-CM

## 2021-12-10 RX ORDER — ZOLPIDEM TARTRATE 10 MG/1
TABLET ORAL
Qty: 15 TABLET | OUTPATIENT
Start: 2021-12-10

## 2021-12-10 NOTE — TELEPHONE ENCOUNTER
zolpidem (AMBIEN) 10 MG tablet 15 tablet 3 12/8/2021  No   Sig: TAKE 1/2 TABLET BY MOUTH EVERY NIGHT AS NEEDED FOR SLEEP   Sent to pharmacy as: Zolpidem Tartrate 10 MG Oral Tablet (AMBIEN)   Class: E-Prescribe   Order: [id number]   E-Prescribing Status: Receipt confirmed by pharmacy (12/8/2021  6:33 PM CST)     [facility] #[id number] - [place], [place] - [address] AT [facility] OF [address]    Rx refused. Duplicate/early request. Pharmacy notified.      Teetee Jain RN  [facility]

## 2022-02-18 DIAGNOSIS — F41.9 ANXIETY: ICD-10-CM

## 2022-02-18 DIAGNOSIS — E03.9 ACQUIRED HYPOTHYROIDISM: ICD-10-CM

## 2022-02-20 ENCOUNTER — HEALTH MAINTENANCE LETTER (OUTPATIENT)
Age: 67
End: 2022-02-20

## 2022-02-21 RX ORDER — CITALOPRAM HYDROBROMIDE 20 MG/1
TABLET ORAL
Qty: 90 TABLET | Refills: 1 | Status: SHIPPED | OUTPATIENT
Start: 2022-02-21

## 2022-02-21 RX ORDER — LEVOTHYROXINE SODIUM 50 UG/1
TABLET ORAL
Qty: 90 TABLET | Refills: 0 | Status: SHIPPED | OUTPATIENT
Start: 2022-02-21

## 2022-05-05 DIAGNOSIS — G47.00 INSOMNIA, UNSPECIFIED TYPE: ICD-10-CM

## 2022-05-05 RX ORDER — ZOLPIDEM TARTRATE 10 MG/1
TABLET ORAL
Qty: 15 TABLET | OUTPATIENT
Start: 2022-05-05

## 2022-05-05 NOTE — TELEPHONE ENCOUNTER
Rx denied to pharmacy. Called patient and patient has established care with new PCP and is no longer a patient in the [facility] system. Patient already contacted clinic and will have script redirected for approval to new PCP.    Margarita Ramirez RN

## 2022-09-07 PROCEDURE — 88305 TISSUE EXAM BY PATHOLOGIST: CPT | Mod: TC,ORL | Performed by: INTERNAL MEDICINE

## 2022-09-08 ENCOUNTER — LAB REQUISITION (OUTPATIENT)
Dept: LAB | Facility: CLINIC | Age: 67
End: 2022-09-08
Payer: COMMERCIAL

## 2022-09-08 DIAGNOSIS — K57.30 DIVERTICULOSIS OF LARGE INTESTINE WITHOUT PERFORATION OR ABSCESS WITHOUT BLEEDING: ICD-10-CM

## 2022-09-08 DIAGNOSIS — Z12.11 ENCOUNTER FOR SCREENING FOR MALIGNANT NEOPLASM OF COLON: ICD-10-CM

## 2022-09-08 DIAGNOSIS — K63.89 OTHER SPECIFIED DISEASES OF INTESTINE: ICD-10-CM

## 2022-09-11 LAB
PATH REPORT.COMMENTS IMP SPEC: NORMAL
PATH REPORT.COMMENTS IMP SPEC: NORMAL
PATH REPORT.FINAL DX SPEC: NORMAL
PATH REPORT.GROSS SPEC: NORMAL
PATH REPORT.MICROSCOPIC SPEC OTHER STN: NORMAL
PATH REPORT.RELEVANT HX SPEC: NORMAL
PHOTO IMAGE: NORMAL

## 2022-09-11 PROCEDURE — 88305 TISSUE EXAM BY PATHOLOGIST: CPT | Mod: 26 | Performed by: PATHOLOGY

## 2022-10-23 ENCOUNTER — HEALTH MAINTENANCE LETTER (OUTPATIENT)
Age: 67
End: 2022-10-23

## 2023-06-24 ENCOUNTER — HEALTH MAINTENANCE LETTER (OUTPATIENT)
Age: 68
End: 2023-06-24

## 2024-04-09 NOTE — PROGRESS NOTES
[facility] Initial Evaluation  Subjective:    Therapist Generated HPI Evaluation  Problem details: On/off for years; self- employed; CC: pain in L shoulder  Blade and into arm; Feels like motion is limited; Also getting numbness down arm into forearm. Does a lot of yoga - a lot of the things asked to do it bothers her; Saw Yessi last fall until last December (see notes)   .         Type of problem:  Left shoulder.    This is a recurrent condition.  Condition occurred with:  Repetition/overuse.        Pain radiates to:  Upper arm, elbow and lower arm.     Associated symptoms:  Loss of motion/stiffness. Symptoms are exacerbated by certain positions and carrying  and relieved by ice and rest (massage (ball)).                              Objective:  Standing Alignment:    Cervical/Thoracic:  Forward head  Shoulder/UE:  Rounded shoulders                  Flexibility/Screens:   Negative screens: Cervical                         Cervical/Thoracic Evaluation  Arom wnl cervical: Cleared C/S but reduced ROM.                   Cervical Palpation:    Tenderness present at Left:    Upper Trap                 Shoulder Evaluation:  ROM:  AROM:    Flexion:  Left:  171    Right:  180    Abduction:  Left: 170   Right:  180    Internal Rotation:  Left:  45    Right:  35  External Rotation:  Left:  95    Right:  110                      Strength:  : L RTC: grossly 4+/5.                                                               General     ROS    Assessment/Plan:    Patient is a 65 year old female with left side shoulder complaints.    Patient has the following significant findings with corresponding treatment plan.                Diagnosis 1:  L sh pain - possibly due to combination of RTC muscle imbalances, reduced cervical ROM, decreased GH ROM.   Pain -  self management, education and home program  Decreased ROM/flexibility - manual therapy and therapeutic exercise  Decreased strength - therapeutic  exercise and therapeutic activities  Impaired muscle performance - neuro re-education  Decreased function - therapeutic activities    Therapy Evaluation Codes:       Previous and current functional limitations:  (See Goal Flow Sheet for this information)    Short term and Long term goals: (See Goal Flow Sheet for this information)     Communication ability:  Patient appears to be able to clearly communicate and understand verbal and written communication and follow directions correctly.  Treatment Explanation - The following has been discussed with the patient:   RX ordered/plan of care  Anticipated outcomes  Possible risks and side effects  This patient would benefit from PT intervention to resume normal activities.   Rehab potential is good.    Frequency:  1 X week, once daily  Duration:  for 6 weeks  Discharge Plan:  Achieve all LTG.  Independent in home treatment program.  Reach maximal therapeutic benefit.    Please refer to the daily flowsheet for treatment today, total treatment time and time spent performing 1:1 timed codes.        Male